# Patient Record
Sex: MALE | Race: WHITE | Employment: FULL TIME | URBAN - METROPOLITAN AREA
[De-identification: names, ages, dates, MRNs, and addresses within clinical notes are randomized per-mention and may not be internally consistent; named-entity substitution may affect disease eponyms.]

---

## 2022-11-27 ENCOUNTER — APPOINTMENT (OUTPATIENT)
Dept: CT IMAGING | Age: 32
DRG: 193 | End: 2022-11-27
Attending: EMERGENCY MEDICINE
Payer: MEDICAID

## 2022-11-27 ENCOUNTER — APPOINTMENT (OUTPATIENT)
Dept: GENERAL RADIOLOGY | Age: 32
DRG: 193 | End: 2022-11-27
Attending: EMERGENCY MEDICINE
Payer: MEDICAID

## 2022-11-27 ENCOUNTER — HOSPITAL ENCOUNTER (INPATIENT)
Age: 32
LOS: 2 days | Discharge: HOME | DRG: 193 | End: 2022-11-29
Attending: EMERGENCY MEDICINE | Admitting: HOSPITALIST
Payer: MEDICAID

## 2022-11-27 DIAGNOSIS — A41.9 SEPSIS, DUE TO UNSPECIFIED ORGANISM, UNSPECIFIED WHETHER ACUTE ORGAN DYSFUNCTION PRESENT (HCC): ICD-10-CM

## 2022-11-27 DIAGNOSIS — E13.10 DIABETIC KETOACIDOSIS WITHOUT COMA ASSOCIATED WITH OTHER SPECIFIED DIABETES MELLITUS (HCC): ICD-10-CM

## 2022-11-27 DIAGNOSIS — J18.9 COMMUNITY ACQUIRED PNEUMONIA OF RIGHT LUNG, UNSPECIFIED PART OF LUNG: Primary | ICD-10-CM

## 2022-11-27 DIAGNOSIS — E11.65 TYPE 2 DIABETES MELLITUS WITH HYPERGLYCEMIA, WITHOUT LONG-TERM CURRENT USE OF INSULIN (HCC): ICD-10-CM

## 2022-11-27 DIAGNOSIS — E87.1 HYPONATREMIA: ICD-10-CM

## 2022-11-27 DIAGNOSIS — E87.20 METABOLIC ACIDOSIS: ICD-10-CM

## 2022-11-27 DIAGNOSIS — R73.9 HYPERGLYCEMIA: ICD-10-CM

## 2022-11-27 LAB
ADMINISTERED INITIALS, ADMINIT: NORMAL
ALBUMIN SERPL-MCNC: 2.8 G/DL (ref 3.5–5)
ALBUMIN/GLOB SERPL: 0.5 {RATIO} (ref 1.1–2.2)
ALP SERPL-CCNC: 155 U/L (ref 45–117)
ALT SERPL-CCNC: 40 U/L (ref 12–78)
ANION GAP SERPL CALC-SCNC: 24 MMOL/L (ref 5–15)
AST SERPL-CCNC: 25 U/L (ref 15–37)
B-OH-BUTYR SERPL-SCNC: >4.42 MMOL/L
BASOPHILS # BLD: 0 K/UL (ref 0–0.1)
BASOPHILS NFR BLD: 0 % (ref 0–1)
BILIRUB SERPL-MCNC: 0.8 MG/DL (ref 0.2–1)
BUN SERPL-MCNC: 20 MG/DL (ref 6–20)
BUN/CREAT SERPL: 16 (ref 12–20)
CALCIUM SERPL-MCNC: 9.5 MG/DL (ref 8.5–10.1)
CHLORIDE SERPL-SCNC: 87 MMOL/L (ref 97–108)
CO2 SERPL-SCNC: 14 MMOL/L (ref 21–32)
COMMENT, HOLDF: NORMAL
COVID-19 RAPID TEST, COVR: NOT DETECTED
CREAT SERPL-MCNC: 1.26 MG/DL (ref 0.7–1.3)
D50 ADMINISTERED, D50ADM: 0 ML
D50 ORDER, D50ORD: 0 ML
DIFFERENTIAL METHOD BLD: ABNORMAL
EOSINOPHIL # BLD: 0 K/UL (ref 0–0.4)
EOSINOPHIL NFR BLD: 0 % (ref 0–7)
ERYTHROCYTE [DISTWIDTH] IN BLOOD BY AUTOMATED COUNT: 12.4 % (ref 11.5–14.5)
FLUAV AG NPH QL IA: NEGATIVE
FLUBV AG NOSE QL IA: NEGATIVE
GLOBULIN SER CALC-MCNC: 5.8 G/DL (ref 2–4)
GLUCOSE BLD STRIP.AUTO-MCNC: 233 MG/DL (ref 65–117)
GLUCOSE BLD STRIP.AUTO-MCNC: 259 MG/DL (ref 65–117)
GLUCOSE BLD STRIP.AUTO-MCNC: 267 MG/DL (ref 65–117)
GLUCOSE BLD STRIP.AUTO-MCNC: 326 MG/DL (ref 65–117)
GLUCOSE BLD STRIP.AUTO-MCNC: 339 MG/DL (ref 65–117)
GLUCOSE SERPL-MCNC: 416 MG/DL (ref 65–100)
GLUCOSE, GLC: 233 MG/DL
GLUCOSE, GLC: 259 MG/DL
GLUCOSE, GLC: 267 MG/DL
GLUCOSE, GLC: 326 MG/DL
GLUCOSE, GLC: 339 MG/DL
HCT VFR BLD AUTO: 48.6 % (ref 36.6–50.3)
HGB BLD-MCNC: 16.3 G/DL (ref 12.1–17)
HIGH TARGET, HITG: 200 MG/DL
IMM GRANULOCYTES # BLD AUTO: 0 K/UL
IMM GRANULOCYTES NFR BLD AUTO: 0 %
INSULIN ADMINSTERED, INSADM: 10 UNITS/HOUR
INSULIN ADMINSTERED, INSADM: 10.4 UNITS/HOUR
INSULIN ADMINSTERED, INSADM: 5.6 UNITS/HOUR
INSULIN ADMINSTERED, INSADM: 8 UNITS/HOUR
INSULIN ADMINSTERED, INSADM: 8.3 UNITS/HOUR
INSULIN ORDER, INSORD: 10 UNITS/HOUR
INSULIN ORDER, INSORD: 10.4 UNITS/HOUR
INSULIN ORDER, INSORD: 5.6 UNITS/HOUR
INSULIN ORDER, INSORD: 8 UNITS/HOUR
INSULIN ORDER, INSORD: 8.3 UNITS/HOUR
LACTATE SERPL-SCNC: 2.3 MMOL/L (ref 0.4–2)
LOW TARGET, LOT: 150 MG/DL
LYMPHOCYTES # BLD: 0.8 K/UL (ref 0.8–3.5)
LYMPHOCYTES NFR BLD: 3 % (ref 12–49)
MCH RBC QN AUTO: 28.6 PG (ref 26–34)
MCHC RBC AUTO-ENTMCNC: 33.5 G/DL (ref 30–36.5)
MCV RBC AUTO: 85.4 FL (ref 80–99)
METAMYELOCYTES NFR BLD MANUAL: 1 %
MINUTES UNTIL NEXT BG, NBG: 60 MIN
MONOCYTES # BLD: 2 K/UL (ref 0–1)
MONOCYTES NFR BLD: 8 % (ref 5–13)
MULTIPLIER, MUL: 0.02
MULTIPLIER, MUL: 0.03
MULTIPLIER, MUL: 0.04
MULTIPLIER, MUL: 0.05
MULTIPLIER, MUL: 0.06
NEUTS BAND NFR BLD MANUAL: 1 % (ref 0–6)
NEUTS SEG # BLD: 22.1 K/UL (ref 1.8–8)
NEUTS SEG NFR BLD: 87 % (ref 32–75)
NRBC # BLD: 0 K/UL (ref 0–0.01)
NRBC BLD-RTO: 0 PER 100 WBC
ORDER INITIALS, ORDINIT: NORMAL
PLATELET # BLD AUTO: 300 K/UL (ref 150–400)
PMV BLD AUTO: 10.9 FL (ref 8.9–12.9)
POTASSIUM SERPL-SCNC: 3.6 MMOL/L (ref 3.5–5.1)
PROT SERPL-MCNC: 8.6 G/DL (ref 6.4–8.2)
RBC # BLD AUTO: 5.69 M/UL (ref 4.1–5.7)
RBC MORPH BLD: ABNORMAL
SAMPLES BEING HELD,HOLD: NORMAL
SERVICE CMNT-IMP: ABNORMAL
SODIUM SERPL-SCNC: 125 MMOL/L (ref 136–145)
SOURCE, COVRS: NORMAL
TROPONIN-HIGH SENSITIVITY: 5 NG/L (ref 0–76)
WBC # BLD AUTO: 25.1 K/UL (ref 4.1–11.1)
WBC MORPH BLD: ABNORMAL

## 2022-11-27 PROCEDURE — 82962 GLUCOSE BLOOD TEST: CPT

## 2022-11-27 PROCEDURE — 93005 ELECTROCARDIOGRAM TRACING: CPT

## 2022-11-27 PROCEDURE — 74011000636 HC RX REV CODE- 636: Performed by: EMERGENCY MEDICINE

## 2022-11-27 PROCEDURE — 71260 CT THORAX DX C+: CPT

## 2022-11-27 PROCEDURE — 80053 COMPREHEN METABOLIC PANEL: CPT

## 2022-11-27 PROCEDURE — 83605 ASSAY OF LACTIC ACID: CPT

## 2022-11-27 PROCEDURE — 71045 X-RAY EXAM CHEST 1 VIEW: CPT

## 2022-11-27 PROCEDURE — 74011636637 HC RX REV CODE- 636/637: Performed by: EMERGENCY MEDICINE

## 2022-11-27 PROCEDURE — 85025 COMPLETE CBC W/AUTO DIFF WBC: CPT

## 2022-11-27 PROCEDURE — 96375 TX/PRO/DX INJ NEW DRUG ADDON: CPT

## 2022-11-27 PROCEDURE — 65660000001 HC RM ICU INTERMED STEPDOWN

## 2022-11-27 PROCEDURE — 74011000258 HC RX REV CODE- 258: Performed by: EMERGENCY MEDICINE

## 2022-11-27 PROCEDURE — 87635 SARS-COV-2 COVID-19 AMP PRB: CPT

## 2022-11-27 PROCEDURE — 96374 THER/PROPH/DIAG INJ IV PUSH: CPT

## 2022-11-27 PROCEDURE — 82010 KETONE BODYS QUAN: CPT

## 2022-11-27 PROCEDURE — 87804 INFLUENZA ASSAY W/OPTIC: CPT

## 2022-11-27 PROCEDURE — 87040 BLOOD CULTURE FOR BACTERIA: CPT

## 2022-11-27 PROCEDURE — 36415 COLL VENOUS BLD VENIPUNCTURE: CPT

## 2022-11-27 PROCEDURE — 74011250636 HC RX REV CODE- 250/636: Performed by: EMERGENCY MEDICINE

## 2022-11-27 PROCEDURE — 84484 ASSAY OF TROPONIN QUANT: CPT

## 2022-11-27 PROCEDURE — 99285 EMERGENCY DEPT VISIT HI MDM: CPT

## 2022-11-27 PROCEDURE — 74011000250 HC RX REV CODE- 250: Performed by: EMERGENCY MEDICINE

## 2022-11-27 PROCEDURE — 74011250637 HC RX REV CODE- 250/637: Performed by: EMERGENCY MEDICINE

## 2022-11-27 PROCEDURE — 96361 HYDRATE IV INFUSION ADD-ON: CPT

## 2022-11-27 RX ORDER — ACETAMINOPHEN 500 MG
1000 TABLET ORAL ONCE
Status: DISCONTINUED | OUTPATIENT
Start: 2022-11-27 | End: 2022-11-27 | Stop reason: SDUPTHER

## 2022-11-27 RX ORDER — KETOROLAC TROMETHAMINE 30 MG/ML
15 INJECTION, SOLUTION INTRAMUSCULAR; INTRAVENOUS
Status: COMPLETED | OUTPATIENT
Start: 2022-11-27 | End: 2022-11-27

## 2022-11-27 RX ORDER — ACETAMINOPHEN 500 MG
1000 TABLET ORAL
Status: COMPLETED | OUTPATIENT
Start: 2022-11-27 | End: 2022-11-27

## 2022-11-27 RX ORDER — ACETAMINOPHEN 500 MG
1000 TABLET ORAL ONCE
Status: COMPLETED | OUTPATIENT
Start: 2022-11-27 | End: 2022-11-27

## 2022-11-27 RX ORDER — ONDANSETRON 2 MG/ML
4 INJECTION INTRAMUSCULAR; INTRAVENOUS
Status: COMPLETED | OUTPATIENT
Start: 2022-11-27 | End: 2022-11-27

## 2022-11-27 RX ORDER — MAGNESIUM SULFATE 100 %
4 CRYSTALS MISCELLANEOUS AS NEEDED
Status: DISCONTINUED | OUTPATIENT
Start: 2022-11-27 | End: 2022-11-28 | Stop reason: SDUPTHER

## 2022-11-27 RX ORDER — IPRATROPIUM BROMIDE AND ALBUTEROL SULFATE 2.5; .5 MG/3ML; MG/3ML
9 SOLUTION RESPIRATORY (INHALATION)
Status: COMPLETED | OUTPATIENT
Start: 2022-11-27 | End: 2022-11-27

## 2022-11-27 RX ORDER — INSULIN LISPRO 100 [IU]/ML
INJECTION, SOLUTION INTRAVENOUS; SUBCUTANEOUS
Status: DISCONTINUED | OUTPATIENT
Start: 2022-11-28 | End: 2022-11-28

## 2022-11-27 RX ADMIN — KETOROLAC TROMETHAMINE 15 MG: 30 INJECTION, SOLUTION INTRAMUSCULAR; INTRAVENOUS at 15:19

## 2022-11-27 RX ADMIN — Medication 8 UNITS/HR: at 20:30

## 2022-11-27 RX ADMIN — IPRATROPIUM BROMIDE AND ALBUTEROL SULFATE 9 ML: .5; 3 SOLUTION RESPIRATORY (INHALATION) at 15:50

## 2022-11-27 RX ADMIN — SODIUM CHLORIDE 2 G: 9 INJECTION INTRAMUSCULAR; INTRAVENOUS; SUBCUTANEOUS at 15:20

## 2022-11-27 RX ADMIN — ACETAMINOPHEN 1000 MG: 500 TABLET ORAL at 20:01

## 2022-11-27 RX ADMIN — Medication 0.05 UNITS/KG/HR: at 18:51

## 2022-11-27 RX ADMIN — SODIUM CHLORIDE 1000 ML: 9 INJECTION, SOLUTION INTRAVENOUS at 15:16

## 2022-11-27 RX ADMIN — IOPAMIDOL 100 ML: 612 INJECTION, SOLUTION INTRAVENOUS at 15:37

## 2022-11-27 RX ADMIN — ONDANSETRON HYDROCHLORIDE 4 MG: 2 SOLUTION INTRAMUSCULAR; INTRAVENOUS at 15:18

## 2022-11-27 RX ADMIN — SODIUM CHLORIDE 1000 ML: 9 INJECTION, SOLUTION INTRAVENOUS at 14:49

## 2022-11-27 RX ADMIN — ACETAMINOPHEN 1000 MG: 500 TABLET ORAL at 15:49

## 2022-11-27 RX ADMIN — AZITHROMYCIN DIHYDRATE 500 MG: 500 INJECTION, POWDER, LYOPHILIZED, FOR SOLUTION INTRAVENOUS at 16:01

## 2022-11-27 NOTE — ED NOTES
Pt arrives ambulatory to ED with CC of Chest Pain and SOB accompanied with fever of 104 at home. Pt denies radiating pain. Pt notes sx persist for approx 4-5 days.

## 2022-11-28 LAB
ADMINISTERED INITIALS, ADMINIT: NORMAL
AMPHET UR QL SCN: NEGATIVE
ANION GAP SERPL CALC-SCNC: 11 MMOL/L (ref 5–15)
ATRIAL RATE: 106 BPM
ATRIAL RATE: 115 BPM
ATRIAL RATE: 139 BPM
ATRIAL RATE: 143 BPM
B PERT DNA SPEC QL NAA+PROBE: NOT DETECTED
BARBITURATES UR QL SCN: NEGATIVE
BENZODIAZ UR QL: NEGATIVE
BORDETELLA PARAPERTUSSIS PCR, BORPAR: NOT DETECTED
BUN SERPL-MCNC: 14 MG/DL (ref 6–20)
BUN/CREAT SERPL: 18 (ref 12–20)
C PNEUM DNA SPEC QL NAA+PROBE: NOT DETECTED
CALCIUM SERPL-MCNC: 9.1 MG/DL (ref 8.5–10.1)
CALCULATED P AXIS, ECG09: 42 DEGREES
CALCULATED P AXIS, ECG09: 45 DEGREES
CALCULATED P AXIS, ECG09: 70 DEGREES
CALCULATED P AXIS, ECG09: 83 DEGREES
CALCULATED R AXIS, ECG10: 119 DEGREES
CALCULATED R AXIS, ECG10: 128 DEGREES
CALCULATED R AXIS, ECG10: 85 DEGREES
CALCULATED R AXIS, ECG10: 94 DEGREES
CALCULATED T AXIS, ECG11: 26 DEGREES
CALCULATED T AXIS, ECG11: 41 DEGREES
CALCULATED T AXIS, ECG11: 46 DEGREES
CALCULATED T AXIS, ECG11: 58 DEGREES
CANNABINOIDS UR QL SCN: NEGATIVE
CHLORIDE SERPL-SCNC: 100 MMOL/L (ref 97–108)
CO2 SERPL-SCNC: 22 MMOL/L (ref 21–32)
COCAINE UR QL SCN: NEGATIVE
CREAT SERPL-MCNC: 0.8 MG/DL (ref 0.7–1.3)
D50 ADMINISTERED, D50ADM: 0 ML
D50 ORDER, D50ORD: 0 ML
DIAGNOSIS, 93000: NORMAL
DRUG SCRN COMMENT,DRGCM: ABNORMAL
EST. AVERAGE GLUCOSE BLD GHB EST-MCNC: 298 MG/DL
FLUAV SUBTYP SPEC NAA+PROBE: NOT DETECTED
FLUBV RNA SPEC QL NAA+PROBE: NOT DETECTED
GLUCOSE BLD STRIP.AUTO-MCNC: 131 MG/DL (ref 65–117)
GLUCOSE BLD STRIP.AUTO-MCNC: 144 MG/DL (ref 65–117)
GLUCOSE BLD STRIP.AUTO-MCNC: 148 MG/DL (ref 65–117)
GLUCOSE BLD STRIP.AUTO-MCNC: 151 MG/DL (ref 65–117)
GLUCOSE BLD STRIP.AUTO-MCNC: 153 MG/DL (ref 65–117)
GLUCOSE BLD STRIP.AUTO-MCNC: 158 MG/DL (ref 65–117)
GLUCOSE BLD STRIP.AUTO-MCNC: 173 MG/DL (ref 65–117)
GLUCOSE BLD STRIP.AUTO-MCNC: 182 MG/DL (ref 65–117)
GLUCOSE BLD STRIP.AUTO-MCNC: 182 MG/DL (ref 65–117)
GLUCOSE BLD STRIP.AUTO-MCNC: 195 MG/DL (ref 65–117)
GLUCOSE BLD STRIP.AUTO-MCNC: 200 MG/DL (ref 65–117)
GLUCOSE BLD STRIP.AUTO-MCNC: 204 MG/DL (ref 65–117)
GLUCOSE BLD STRIP.AUTO-MCNC: 222 MG/DL (ref 65–117)
GLUCOSE SERPL-MCNC: 154 MG/DL (ref 65–100)
GLUCOSE, GLC: 131 MG/DL
GLUCOSE, GLC: 144 MG/DL
GLUCOSE, GLC: 148 MG/DL
GLUCOSE, GLC: 151 MG/DL
GLUCOSE, GLC: 153 MG/DL
GLUCOSE, GLC: 158 MG/DL
GLUCOSE, GLC: 173 MG/DL
GLUCOSE, GLC: 182 MG/DL
GLUCOSE, GLC: 200 MG/DL
GLUCOSE, GLC: 204 MG/DL
GLUCOSE, GLC: 222 MG/DL
HADV DNA SPEC QL NAA+PROBE: NOT DETECTED
HBA1C MFR BLD: 12 % (ref 4–5.6)
HCOV 229E RNA SPEC QL NAA+PROBE: NOT DETECTED
HCOV HKU1 RNA SPEC QL NAA+PROBE: NOT DETECTED
HCOV NL63 RNA SPEC QL NAA+PROBE: NOT DETECTED
HCOV OC43 RNA SPEC QL NAA+PROBE: NOT DETECTED
HIGH TARGET, HITG: 200 MG/DL
HMPV RNA SPEC QL NAA+PROBE: NOT DETECTED
HPIV1 RNA SPEC QL NAA+PROBE: NOT DETECTED
HPIV2 RNA SPEC QL NAA+PROBE: NOT DETECTED
HPIV3 RNA SPEC QL NAA+PROBE: NOT DETECTED
HPIV4 RNA SPEC QL NAA+PROBE: NOT DETECTED
INSULIN ADMINSTERED, INSADM: 11.3 UNITS/HOUR
INSULIN ADMINSTERED, INSADM: 11.5 UNITS/HOUR
INSULIN ADMINSTERED, INSADM: 3.4 UNITS/HOUR
INSULIN ADMINSTERED, INSADM: 4.5 UNITS/HOUR
INSULIN ADMINSTERED, INSADM: 4.5 UNITS/HOUR
INSULIN ADMINSTERED, INSADM: 5 UNITS/HOUR
INSULIN ADMINSTERED, INSADM: 7.3 UNITS/HOUR
INSULIN ADMINSTERED, INSADM: 7.4 UNITS/HOUR
INSULIN ADMINSTERED, INSADM: 7.8 UNITS/HOUR
INSULIN ADMINSTERED, INSADM: 9 UNITS/HOUR
INSULIN ADMINSTERED, INSADM: 9.8 UNITS/HOUR
INSULIN ORDER, INSORD: 11.3 UNITS/HOUR
INSULIN ORDER, INSORD: 11.5 UNITS/HOUR
INSULIN ORDER, INSORD: 3.4 UNITS/HOUR
INSULIN ORDER, INSORD: 4.5 UNITS/HOUR
INSULIN ORDER, INSORD: 4.5 UNITS/HOUR
INSULIN ORDER, INSORD: 5 UNITS/HOUR
INSULIN ORDER, INSORD: 7.3 UNITS/HOUR
INSULIN ORDER, INSORD: 7.4 UNITS/HOUR
INSULIN ORDER, INSORD: 7.8 UNITS/HOUR
INSULIN ORDER, INSORD: 9 UNITS/HOUR
INSULIN ORDER, INSORD: 9.8 UNITS/HOUR
LACTATE SERPL-SCNC: 1.2 MMOL/L (ref 0.4–2)
LIPASE SERPL-CCNC: 64 U/L (ref 73–393)
LOW TARGET, LOT: 150 MG/DL
M PNEUMO DNA SPEC QL NAA+PROBE: NOT DETECTED
MAGNESIUM SERPL-MCNC: 1.9 MG/DL (ref 1.6–2.4)
MAGNESIUM SERPL-MCNC: 2.2 MG/DL (ref 1.6–2.4)
METHADONE UR QL: NEGATIVE
MINUTES UNTIL NEXT BG, NBG: 120 MIN
MINUTES UNTIL NEXT BG, NBG: 120 MIN
MINUTES UNTIL NEXT BG, NBG: 60 MIN
MULTIPLIER, MUL: 0.04
MULTIPLIER, MUL: 0.04
MULTIPLIER, MUL: 0.05
MULTIPLIER, MUL: 0.06
MULTIPLIER, MUL: 0.07
MULTIPLIER, MUL: 0.07
MULTIPLIER, MUL: 0.08
OPIATES UR QL: POSITIVE
ORDER INITIALS, ORDINIT: NORMAL
P-R INTERVAL, ECG05: 128 MS
P-R INTERVAL, ECG05: 138 MS
P-R INTERVAL, ECG05: 138 MS
P-R INTERVAL, ECG05: 144 MS
PCP UR QL: NEGATIVE
PHOSPHATE SERPL-MCNC: 1 MG/DL (ref 2.6–4.7)
POTASSIUM SERPL-SCNC: 3.1 MMOL/L (ref 3.5–5.1)
Q-T INTERVAL, ECG07: 284 MS
Q-T INTERVAL, ECG07: 288 MS
Q-T INTERVAL, ECG07: 340 MS
Q-T INTERVAL, ECG07: 378 MS
QRS DURATION, ECG06: 86 MS
QRS DURATION, ECG06: 90 MS
QRS DURATION, ECG06: 92 MS
QRS DURATION, ECG06: 92 MS
QTC CALCULATION (BEZET), ECG08: 438 MS
QTC CALCULATION (BEZET), ECG08: 438 MS
QTC CALCULATION (BEZET), ECG08: 470 MS
QTC CALCULATION (BEZET), ECG08: 502 MS
RSV RNA SPEC QL NAA+PROBE: NOT DETECTED
RV+EV RNA SPEC QL NAA+PROBE: DETECTED
SARS-COV-2 PCR, COVPCR: NOT DETECTED
SERVICE CMNT-IMP: ABNORMAL
SODIUM SERPL-SCNC: 133 MMOL/L (ref 136–145)
VENTRICULAR RATE, ECG03: 106 BPM
VENTRICULAR RATE, ECG03: 115 BPM
VENTRICULAR RATE, ECG03: 139 BPM
VENTRICULAR RATE, ECG03: 143 BPM

## 2022-11-28 PROCEDURE — 74011250637 HC RX REV CODE- 250/637: Performed by: EMERGENCY MEDICINE

## 2022-11-28 PROCEDURE — 80307 DRUG TEST PRSMV CHEM ANLYZR: CPT

## 2022-11-28 PROCEDURE — 74011000258 HC RX REV CODE- 258: Performed by: EMERGENCY MEDICINE

## 2022-11-28 PROCEDURE — 83735 ASSAY OF MAGNESIUM: CPT

## 2022-11-28 PROCEDURE — 36415 COLL VENOUS BLD VENIPUNCTURE: CPT

## 2022-11-28 PROCEDURE — 74011636637 HC RX REV CODE- 636/637: Performed by: HOSPITALIST

## 2022-11-28 PROCEDURE — 93005 ELECTROCARDIOGRAM TRACING: CPT

## 2022-11-28 PROCEDURE — 74011250636 HC RX REV CODE- 250/636: Performed by: HOSPITALIST

## 2022-11-28 PROCEDURE — 74011250637 HC RX REV CODE- 250/637: Performed by: HOSPITALIST

## 2022-11-28 PROCEDURE — 82962 GLUCOSE BLOOD TEST: CPT

## 2022-11-28 PROCEDURE — 74011000250 HC RX REV CODE- 250: Performed by: HOSPITALIST

## 2022-11-28 PROCEDURE — 74011000250 HC RX REV CODE- 250: Performed by: EMERGENCY MEDICINE

## 2022-11-28 PROCEDURE — 83690 ASSAY OF LIPASE: CPT

## 2022-11-28 PROCEDURE — 74011636637 HC RX REV CODE- 636/637: Performed by: NURSE PRACTITIONER

## 2022-11-28 PROCEDURE — 87449 NOS EACH ORGANISM AG IA: CPT

## 2022-11-28 PROCEDURE — 84100 ASSAY OF PHOSPHORUS: CPT

## 2022-11-28 PROCEDURE — 74011250637 HC RX REV CODE- 250/637: Performed by: NURSE PRACTITIONER

## 2022-11-28 PROCEDURE — 80048 BASIC METABOLIC PNL TOTAL CA: CPT

## 2022-11-28 PROCEDURE — 74011636637 HC RX REV CODE- 636/637: Performed by: EMERGENCY MEDICINE

## 2022-11-28 PROCEDURE — 83605 ASSAY OF LACTIC ACID: CPT

## 2022-11-28 PROCEDURE — 0202U NFCT DS 22 TRGT SARS-COV-2: CPT

## 2022-11-28 PROCEDURE — 65660000001 HC RM ICU INTERMED STEPDOWN

## 2022-11-28 PROCEDURE — 83036 HEMOGLOBIN GLYCOSYLATED A1C: CPT

## 2022-11-28 RX ORDER — SODIUM CHLORIDE 0.9 % (FLUSH) 0.9 %
5-40 SYRINGE (ML) INJECTION EVERY 8 HOURS
Status: DISCONTINUED | OUTPATIENT
Start: 2022-11-28 | End: 2022-11-29 | Stop reason: HOSPADM

## 2022-11-28 RX ORDER — BUPROPION HYDROCHLORIDE 75 MG/1
75 TABLET ORAL 2 TIMES DAILY
Status: DISCONTINUED | OUTPATIENT
Start: 2022-11-28 | End: 2022-11-29 | Stop reason: HOSPADM

## 2022-11-28 RX ORDER — INSULIN GLARGINE 100 [IU]/ML
30 INJECTION, SOLUTION SUBCUTANEOUS DAILY
Status: DISCONTINUED | OUTPATIENT
Start: 2022-11-28 | End: 2022-11-29

## 2022-11-28 RX ORDER — ONDANSETRON 2 MG/ML
4 INJECTION INTRAMUSCULAR; INTRAVENOUS
Status: DISCONTINUED | OUTPATIENT
Start: 2022-11-28 | End: 2022-11-29 | Stop reason: HOSPADM

## 2022-11-28 RX ORDER — SODIUM CHLORIDE 450 MG/100ML
100 INJECTION, SOLUTION INTRAVENOUS CONTINUOUS
Status: DISCONTINUED | OUTPATIENT
Start: 2022-11-28 | End: 2022-11-29 | Stop reason: HOSPADM

## 2022-11-28 RX ORDER — SODIUM CHLORIDE 0.9 % (FLUSH) 0.9 %
5-40 SYRINGE (ML) INJECTION AS NEEDED
Status: DISCONTINUED | OUTPATIENT
Start: 2022-11-28 | End: 2022-11-29 | Stop reason: HOSPADM

## 2022-11-28 RX ORDER — BUPROPION HYDROCHLORIDE 75 MG/1
75 TABLET ORAL 2 TIMES DAILY
COMMUNITY

## 2022-11-28 RX ORDER — ENOXAPARIN SODIUM 100 MG/ML
40 INJECTION SUBCUTANEOUS EVERY 12 HOURS
Status: DISCONTINUED | OUTPATIENT
Start: 2022-11-28 | End: 2022-11-29

## 2022-11-28 RX ORDER — HYDROCODONE BITARTRATE AND ACETAMINOPHEN 5; 325 MG/1; MG/1
1 TABLET ORAL
Status: DISCONTINUED | OUTPATIENT
Start: 2022-11-28 | End: 2022-11-29 | Stop reason: HOSPADM

## 2022-11-28 RX ORDER — HYDROXYZINE 50 MG/1
25 TABLET, FILM COATED ORAL 2 TIMES DAILY
COMMUNITY

## 2022-11-28 RX ORDER — POTASSIUM CHLORIDE 7.45 MG/ML
10 INJECTION INTRAVENOUS
Status: COMPLETED | OUTPATIENT
Start: 2022-11-28 | End: 2022-11-28

## 2022-11-28 RX ORDER — INSULIN GLARGINE 100 [IU]/ML
60 INJECTION, SOLUTION SUBCUTANEOUS DAILY
Status: DISCONTINUED | OUTPATIENT
Start: 2022-11-28 | End: 2022-11-28

## 2022-11-28 RX ORDER — INSULIN LISPRO 100 [IU]/ML
INJECTION, SOLUTION INTRAVENOUS; SUBCUTANEOUS
Status: DISCONTINUED | OUTPATIENT
Start: 2022-11-28 | End: 2022-11-29 | Stop reason: HOSPADM

## 2022-11-28 RX ORDER — POLYETHYLENE GLYCOL 3350 17 G/17G
17 POWDER, FOR SOLUTION ORAL DAILY PRN
Status: DISCONTINUED | OUTPATIENT
Start: 2022-11-28 | End: 2022-11-29 | Stop reason: HOSPADM

## 2022-11-28 RX ORDER — PROMETHAZINE HYDROCHLORIDE 25 MG/1
12.5 TABLET ORAL
Status: DISCONTINUED | OUTPATIENT
Start: 2022-11-28 | End: 2022-11-29 | Stop reason: HOSPADM

## 2022-11-28 RX ORDER — HYDROXYZINE 25 MG/1
25 TABLET, FILM COATED ORAL 2 TIMES DAILY
Status: DISCONTINUED | OUTPATIENT
Start: 2022-11-28 | End: 2022-11-29 | Stop reason: HOSPADM

## 2022-11-28 RX ORDER — MAGNESIUM SULFATE 100 %
4 CRYSTALS MISCELLANEOUS AS NEEDED
Status: DISCONTINUED | OUTPATIENT
Start: 2022-11-28 | End: 2022-11-29 | Stop reason: HOSPADM

## 2022-11-28 RX ORDER — ACETAMINOPHEN 500 MG
1000 TABLET ORAL ONCE
Status: COMPLETED | OUTPATIENT
Start: 2022-11-28 | End: 2022-11-28

## 2022-11-28 RX ORDER — ACETAMINOPHEN 650 MG/1
650 SUPPOSITORY RECTAL
Status: DISCONTINUED | OUTPATIENT
Start: 2022-11-28 | End: 2022-11-29 | Stop reason: HOSPADM

## 2022-11-28 RX ORDER — ACETAMINOPHEN 325 MG/1
650 TABLET ORAL
Status: DISCONTINUED | OUTPATIENT
Start: 2022-11-28 | End: 2022-11-29 | Stop reason: HOSPADM

## 2022-11-28 RX ADMIN — POTASSIUM CHLORIDE 10 MEQ: 7.46 INJECTION, SOLUTION INTRAVENOUS at 16:33

## 2022-11-28 RX ADMIN — SODIUM CHLORIDE, PRESERVATIVE FREE 5 ML: 5 INJECTION INTRAVENOUS at 14:51

## 2022-11-28 RX ADMIN — SODIUM CHLORIDE 100 ML/HR: 4.5 INJECTION, SOLUTION INTRAVENOUS at 22:20

## 2022-11-28 RX ADMIN — POTASSIUM CHLORIDE 10 MEQ: 7.46 INJECTION, SOLUTION INTRAVENOUS at 11:19

## 2022-11-28 RX ADMIN — ENOXAPARIN SODIUM 40 MG: 100 INJECTION SUBCUTANEOUS at 22:16

## 2022-11-28 RX ADMIN — DEXTROSE MONOHYDRATE 250 ML: 100 INJECTION, SOLUTION INTRAVENOUS at 03:28

## 2022-11-28 RX ADMIN — Medication 9.8 UNITS/HR: at 01:13

## 2022-11-28 RX ADMIN — Medication 2 UNITS: at 17:44

## 2022-11-28 RX ADMIN — ENOXAPARIN SODIUM 40 MG: 100 INJECTION SUBCUTANEOUS at 11:18

## 2022-11-28 RX ADMIN — POTASSIUM CHLORIDE 10 MEQ: 7.46 INJECTION, SOLUTION INTRAVENOUS at 14:07

## 2022-11-28 RX ADMIN — POTASSIUM CHLORIDE 10 MEQ: 7.46 INJECTION, SOLUTION INTRAVENOUS at 14:59

## 2022-11-28 RX ADMIN — SODIUM CHLORIDE 100 ML/HR: 4.5 INJECTION, SOLUTION INTRAVENOUS at 11:19

## 2022-11-28 RX ADMIN — CEFTRIAXONE SODIUM 1 G: 1 INJECTION, POWDER, FOR SOLUTION INTRAMUSCULAR; INTRAVENOUS at 14:47

## 2022-11-28 RX ADMIN — HYDROXYZINE HYDROCHLORIDE 25 MG: 25 TABLET, FILM COATED ORAL at 17:44

## 2022-11-28 RX ADMIN — ACETAMINOPHEN 650 MG: 325 TABLET ORAL at 23:45

## 2022-11-28 RX ADMIN — HYDROCODONE BITARTRATE AND ACETAMINOPHEN 1 TABLET: 5; 325 TABLET ORAL at 17:44

## 2022-11-28 RX ADMIN — ACETAMINOPHEN 650 MG: 325 TABLET ORAL at 16:41

## 2022-11-28 RX ADMIN — INSULIN GLARGINE 30 UNITS: 100 INJECTION, SOLUTION SUBCUTANEOUS at 14:46

## 2022-11-28 RX ADMIN — AZITHROMYCIN DIHYDRATE 500 MG: 500 INJECTION, POWDER, LYOPHILIZED, FOR SOLUTION INTRAVENOUS at 15:04

## 2022-11-28 RX ADMIN — Medication 7.3 UNITS/HR: at 06:39

## 2022-11-28 RX ADMIN — ACETAMINOPHEN 1000 MG: 500 TABLET ORAL at 06:41

## 2022-11-28 RX ADMIN — BUPROPION HYDROCHLORIDE 75 MG: 75 TABLET, FILM COATED ORAL at 17:44

## 2022-11-28 RX ADMIN — SODIUM CHLORIDE, PRESERVATIVE FREE 10 ML: 5 INJECTION INTRAVENOUS at 22:23

## 2022-11-28 RX ADMIN — HYDROCODONE BITARTRATE AND ACETAMINOPHEN 1 TABLET: 5; 325 TABLET ORAL at 11:15

## 2022-11-28 NOTE — ED NOTES
Hospitalist called back and discussed plan of care with this RN. Orders have been submitted and discussed.

## 2022-11-28 NOTE — PROGRESS NOTES
1230: TRANSFER - IN REPORT:    Verbal report received from Adolfo Gamez RN (name) on iNka Neumann  being received from [facility] (unit) for routine progression of care      Report consisted of patients Situation, Background, Assessment and   Recommendations(SBAR). Information from the following report(s) SBAR, Kardex, MAR, and Cardiac Rhythm ST  was reviewed with the receiving nurse. Opportunity for questions and clarification was provided. Assessment completed upon patients arrival to unit and care assumed. 1319: PT arrived to unit, connected to monitor and VS obtained. Upon arrival rhythm change noted, EKG obtained and indicated NSR with occasional PVCs, ALESSANDRO Lechuga notified. ALESSANDRO Lechuga at bedside. 1400: Pt stated he needs to speak with a  tomorrow related to insurance issues, ALESSANDRO Lechuga notified. 1930: Bedside and Verbal shift change report given to Jaron Sandy RN (oncoming nurse) by Marisol Pineda RN (offgoing nurse). Report included the following information SBAR, Kardex, MAR, and Cardiac Rhythm NSR with occasional PACs/PVCs . Care Plan:   Problem: Diabetes Self-Management  Goal: *Disease process and treatment process  Description: Define diabetes and identify own type of diabetes; list 3 options for treating diabetes. Outcome: Progressing Towards Goal  Goal: *Incorporating nutritional management into lifestyle  Description: Describe effect of type, amount and timing of food on blood glucose; list 3 methods for planning meals. Outcome: Progressing Towards Goal       Problem: Patient Education: Go to Patient Education Activity  Goal: Patient/Family Education  Outcome: Progressing Towards Goal       Problem: Falls - Risk of  Goal: *Absence of Falls  Description: Document Jair Gaming Fall Risk and appropriate interventions in the flowsheet.   Outcome: Progressing Towards Goal  Note: Fall Risk Interventions:     Medication Interventions: Teach patient to arise slowly       Problem: Patient Education: Go to Patient Education Activity  Goal: Patient/Family Education  Outcome: Progressing Towards Goal

## 2022-11-28 NOTE — ED NOTES
TRANSFER - OUT REPORT:    Verbal report given to GERMÁN Hutson(name) on Radha Yip  being transferred to [facility](unit) for routine progression of care       Report consisted of patients Situation, Background, Assessment and   Recommendations(SBAR). Information from the following report(s) SBAR, ED Summary, MAR, and Recent Results was reviewed with the receiving nurse. Lines:   Peripheral IV 11/27/22 Right Antecubital (Active)       Peripheral IV 11/28/22 Left;Posterior Hand (Active)        Opportunity for questions and clarification was provided.       Patient transported with:   Monitor And IV Medications

## 2022-11-28 NOTE — ED NOTES
Verbal shift change report given to Landen Miner RN (oncoming nurse) by Buddy Marin RN (offgoing nurse). Report included the following information SBAR, ED Summary, MAR, Recent Results, and Cardiac Rhythm Sinus Tach .

## 2022-11-28 NOTE — ED NOTES
Hospitalist aware Lantus is not available at [address] [facility]. Verified IV Compatibility with Pharmacy.

## 2022-11-28 NOTE — ED NOTES
[facility] is on scene. Verbal Report given to   [facility]. [facility] is in possession of EMTALA, ED Summary, ED Facesheet. Patient is resting comfortably in stretcher.

## 2022-11-29 VITALS
RESPIRATION RATE: 18 BRPM | BODY MASS INDEX: 36.45 KG/M2 | HEIGHT: 78 IN | SYSTOLIC BLOOD PRESSURE: 143 MMHG | DIASTOLIC BLOOD PRESSURE: 91 MMHG | HEART RATE: 101 BPM | TEMPERATURE: 98.4 F | WEIGHT: 315 LBS | OXYGEN SATURATION: 99 %

## 2022-11-29 LAB
ANION GAP SERPL CALC-SCNC: 10 MMOL/L (ref 5–15)
ANION GAP SERPL CALC-SCNC: 13 MMOL/L (ref 5–15)
BASOPHILS # BLD: 0 K/UL (ref 0–0.1)
BASOPHILS NFR BLD: 0 % (ref 0–1)
BUN SERPL-MCNC: 14 MG/DL (ref 6–20)
BUN SERPL-MCNC: 15 MG/DL (ref 6–20)
BUN/CREAT SERPL: 16 (ref 12–20)
BUN/CREAT SERPL: 21 (ref 12–20)
CALCIUM SERPL-MCNC: 8.5 MG/DL (ref 8.5–10.1)
CALCIUM SERPL-MCNC: 8.6 MG/DL (ref 8.5–10.1)
CHLORIDE SERPL-SCNC: 100 MMOL/L (ref 97–108)
CHLORIDE SERPL-SCNC: 101 MMOL/L (ref 97–108)
CO2 SERPL-SCNC: 23 MMOL/L (ref 21–32)
CO2 SERPL-SCNC: 23 MMOL/L (ref 21–32)
CREAT SERPL-MCNC: 0.73 MG/DL (ref 0.7–1.3)
CREAT SERPL-MCNC: 0.89 MG/DL (ref 0.7–1.3)
DIFFERENTIAL METHOD BLD: ABNORMAL
EOSINOPHIL # BLD: 0.2 K/UL (ref 0–0.4)
EOSINOPHIL NFR BLD: 1 % (ref 0–7)
ERYTHROCYTE [DISTWIDTH] IN BLOOD BY AUTOMATED COUNT: 12.3 % (ref 11.5–14.5)
GLUCOSE BLD STRIP.AUTO-MCNC: 188 MG/DL (ref 65–117)
GLUCOSE BLD STRIP.AUTO-MCNC: 231 MG/DL (ref 65–117)
GLUCOSE BLD STRIP.AUTO-MCNC: 275 MG/DL (ref 65–117)
GLUCOSE SERPL-MCNC: 170 MG/DL (ref 65–100)
GLUCOSE SERPL-MCNC: 243 MG/DL (ref 65–100)
HCT VFR BLD AUTO: 41.3 % (ref 36.6–50.3)
HGB BLD-MCNC: 14.4 G/DL (ref 12.1–17)
IMM GRANULOCYTES # BLD AUTO: 0 K/UL
IMM GRANULOCYTES NFR BLD AUTO: 0 %
LYMPHOCYTES # BLD: 3.3 K/UL (ref 0.8–3.5)
LYMPHOCYTES NFR BLD: 16 % (ref 12–49)
MAGNESIUM SERPL-MCNC: 2 MG/DL (ref 1.6–2.4)
MCH RBC QN AUTO: 29.1 PG (ref 26–34)
MCHC RBC AUTO-ENTMCNC: 34.9 G/DL (ref 30–36.5)
MCV RBC AUTO: 83.6 FL (ref 80–99)
MONOCYTES # BLD: 2.1 K/UL (ref 0–1)
MONOCYTES NFR BLD: 10 % (ref 5–13)
NEUTS SEG # BLD: 14.9 K/UL (ref 1.8–8)
NEUTS SEG NFR BLD: 73 % (ref 32–75)
NRBC # BLD: 0 K/UL (ref 0–0.01)
NRBC BLD-RTO: 0 PER 100 WBC
PHOSPHATE SERPL-MCNC: 1.6 MG/DL (ref 2.6–4.7)
PLATELET # BLD AUTO: 266 K/UL (ref 150–400)
PMV BLD AUTO: 10.7 FL (ref 8.9–12.9)
POTASSIUM SERPL-SCNC: 2.7 MMOL/L (ref 3.5–5.1)
POTASSIUM SERPL-SCNC: 3.2 MMOL/L (ref 3.5–5.1)
RBC # BLD AUTO: 4.94 M/UL (ref 4.1–5.7)
RBC MORPH BLD: ABNORMAL
SERVICE CMNT-IMP: ABNORMAL
SODIUM SERPL-SCNC: 134 MMOL/L (ref 136–145)
SODIUM SERPL-SCNC: 136 MMOL/L (ref 136–145)
WBC # BLD AUTO: 20.5 K/UL (ref 4.1–11.1)

## 2022-11-29 PROCEDURE — 74011250637 HC RX REV CODE- 250/637: Performed by: NURSE PRACTITIONER

## 2022-11-29 PROCEDURE — 74011636637 HC RX REV CODE- 636/637: Performed by: NURSE PRACTITIONER

## 2022-11-29 PROCEDURE — 85025 COMPLETE CBC W/AUTO DIFF WBC: CPT

## 2022-11-29 PROCEDURE — 74011000250 HC RX REV CODE- 250: Performed by: HOSPITALIST

## 2022-11-29 PROCEDURE — 84100 ASSAY OF PHOSPHORUS: CPT

## 2022-11-29 PROCEDURE — 82962 GLUCOSE BLOOD TEST: CPT

## 2022-11-29 PROCEDURE — 83735 ASSAY OF MAGNESIUM: CPT

## 2022-11-29 PROCEDURE — 74011250637 HC RX REV CODE- 250/637: Performed by: HOSPITALIST

## 2022-11-29 PROCEDURE — 80048 BASIC METABOLIC PNL TOTAL CA: CPT

## 2022-11-29 PROCEDURE — 36415 COLL VENOUS BLD VENIPUNCTURE: CPT

## 2022-11-29 PROCEDURE — 74011636637 HC RX REV CODE- 636/637: Performed by: HOSPITALIST

## 2022-11-29 PROCEDURE — 74011250636 HC RX REV CODE- 250/636: Performed by: HOSPITALIST

## 2022-11-29 RX ORDER — INSULIN GLARGINE 100 [IU]/ML
10 INJECTION, SOLUTION SUBCUTANEOUS ONCE
Status: COMPLETED | OUTPATIENT
Start: 2022-11-29 | End: 2022-11-29

## 2022-11-29 RX ORDER — AZITHROMYCIN 250 MG/1
TABLET, FILM COATED ORAL
Qty: 6 TABLET | Refills: 0 | Status: SHIPPED | OUTPATIENT
Start: 2022-11-29 | End: 2022-12-04

## 2022-11-29 RX ORDER — METFORMIN HYDROCHLORIDE 500 MG/1
500 TABLET ORAL 2 TIMES DAILY WITH MEALS
Qty: 60 TABLET | Refills: 2 | Status: SHIPPED | OUTPATIENT
Start: 2022-11-29

## 2022-11-29 RX ORDER — CEFUROXIME AXETIL 500 MG/1
500 TABLET ORAL 2 TIMES DAILY
Qty: 10 TABLET | Refills: 0 | Status: SHIPPED | OUTPATIENT
Start: 2022-11-29

## 2022-11-29 RX ORDER — INSULIN PUMP SYRINGE, 3 ML
EACH MISCELLANEOUS
Qty: 1 KIT | Refills: 0 | Status: SHIPPED | OUTPATIENT
Start: 2022-11-29

## 2022-11-29 RX ORDER — ENOXAPARIN SODIUM 100 MG/ML
30 INJECTION SUBCUTANEOUS EVERY 12 HOURS
Status: DISCONTINUED | OUTPATIENT
Start: 2022-11-29 | End: 2022-11-29 | Stop reason: HOSPADM

## 2022-11-29 RX ORDER — INSULIN GLARGINE 100 [IU]/ML
40 INJECTION, SOLUTION SUBCUTANEOUS DAILY
Status: DISCONTINUED | OUTPATIENT
Start: 2022-11-30 | End: 2022-11-29 | Stop reason: HOSPADM

## 2022-11-29 RX ORDER — INSULIN GLARGINE 100 [IU]/ML
45 INJECTION, SOLUTION SUBCUTANEOUS
Qty: 1 ML | Refills: 2 | Status: SHIPPED | OUTPATIENT
Start: 2022-11-29 | End: 2022-11-29

## 2022-11-29 RX ORDER — INSULIN LISPRO 100 [IU]/ML
10 INJECTION, SOLUTION INTRAVENOUS; SUBCUTANEOUS
Status: DISCONTINUED | OUTPATIENT
Start: 2022-11-29 | End: 2022-11-29 | Stop reason: HOSPADM

## 2022-11-29 RX ADMIN — INSULIN GLARGINE 30 UNITS: 100 INJECTION, SOLUTION SUBCUTANEOUS at 08:55

## 2022-11-29 RX ADMIN — Medication 10 UNITS: at 11:00

## 2022-11-29 RX ADMIN — Medication 10 UNITS: at 12:50

## 2022-11-29 RX ADMIN — SODIUM CHLORIDE, PRESERVATIVE FREE 10 ML: 5 INJECTION INTRAVENOUS at 07:12

## 2022-11-29 RX ADMIN — CEFTRIAXONE SODIUM 1 G: 1 INJECTION, POWDER, FOR SOLUTION INTRAMUSCULAR; INTRAVENOUS at 15:13

## 2022-11-29 RX ADMIN — BUPROPION HYDROCHLORIDE 75 MG: 75 TABLET, FILM COATED ORAL at 08:55

## 2022-11-29 RX ADMIN — HYDROXYZINE HYDROCHLORIDE 25 MG: 25 TABLET, FILM COATED ORAL at 08:55

## 2022-11-29 RX ADMIN — SODIUM CHLORIDE 100 ML/HR: 4.5 INJECTION, SOLUTION INTRAVENOUS at 09:28

## 2022-11-29 RX ADMIN — HYDROCODONE BITARTRATE AND ACETAMINOPHEN 1 TABLET: 5; 325 TABLET ORAL at 10:38

## 2022-11-29 RX ADMIN — Medication 3 UNITS: at 07:06

## 2022-11-29 RX ADMIN — Medication 5 UNITS: at 12:50

## 2022-11-29 RX ADMIN — SODIUM CHLORIDE, PRESERVATIVE FREE 10 ML: 5 INJECTION INTRAVENOUS at 15:13

## 2022-11-29 RX ADMIN — ENOXAPARIN SODIUM 40 MG: 100 INJECTION SUBCUTANEOUS at 08:55

## 2022-11-29 NOTE — DIABETES MGMT
[address] NURSE SPECIALIST CONSULT     Initial Presentation   Naseem Alvarado is a 29 y.o. male who presented to the ED 11/27/22 with a 4-5 day c/o chest pain, SOB and fever of 104. LAB: , Co2 14, AG 24, , , Lac 2.3, A1C 12%, WBC 25.1, B-hydroxybutyrate > 4.42. positive opiates. Rhinovirus/enterovirus positive. Flu/RSV/COVID -,    CXR: Dense airspace disease right upper lobe  CT:  Right upper lobe airspace disease. No suspicious mass/nodule. Calcified granulomas. HX: Polysubstance Abuse, Anxiety, Depression, Type 2 Diabetes     INITIAL DX:   CAP (community acquired pneumonia) [J18.9]   DKA  Current Treatment     TX: Blood cultures, IV Antibiotics, IVF, Insulin gtt    Consulted by  Desmond Mckeon NP  for advanced diabetes nursing assessment and care for:   [x] Inpatient management strategy    Hospital Course   Clinical progress has been uncomplicated. 11/27: Admission. Insulin gtt. 11/28: DKA resolved. Transitioned off insulin gtt to SQ insulin  Diabetes History   Diagnosed with Type 2 Diabetes 6 years ago. Was started on metformin and insulin therapy (doses and insulin type unknown) when in [place]. Took medications for about 6 wks. Was frustrated with elevated BG values and stopped taking medicine. Did not like his provider and stopped going for follow-up care. Diabetes-related Medical History  Acute complications  DKA  Neurological complications  Peripheral neuropathy  Other associated conditions     Depression; Substance Abuse    Diabetes Medication History  Key Antihyperglycemic Medications       Patient is on no antihyperglycemic meds.              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  Monitoring pattern   Doesn't have a glucometer  Taking medications pattern  Stopped taking medications about 5 years ago  Social determinants of health impacting diabetes self-management practices   Struggling with anxiety and/or depression and Concerned that you need to know more about how to stay healthy with diabetes  Overall evaluation:    [x] Not achieving A1c target with drug therapy & self-care practices    Current Vape use  Previous cocaine use- last use 6 mos ago  Was homeless in [place], now back in [address] [facility] living with mom  Legally  and has a 11year old daughter living with him  Works full time at a Car wash in [address] was seeing a doctor who was telling me what to do. It didn't matter how hard I was working, I didn't like being told what to do and being lectured about all the wrong things I was doing.      Objective   Physical exam  General Obese white male in no acute distress/ill-appearing. Conversant and cooperative  Neuro  Alert, oriented   Vital Signs Visit Vitals  /84   Pulse 91   Temp 98.4 °F (36.9 °C)   Resp 18   Ht 6' 6\" (1.981 m)   Wt 150.3 kg (331 lb 5.6 oz)   SpO2 99%   BMI 38.29 kg/m²     Skin  Warm and dry. No acanthosis noted along neckline. Heart   Regular rate and rhythm.  No murmurs, rubs or gallops  Lungs  Clear to auscultation without rales or rhonchi  Extremities No foot wounds        Laboratory  Recent Labs     11/29/22  0319 11/28/22  1433 11/28/22  0906 11/27/22  1410   * 154* 170* 416*   AGAP 10 11 13 24*   WBC 20.5*  --   --  25.1*   CREA 0.73 0.80 0.89 1.26   AST  --   --   --  25   ALT  --   --   --  40       Factors impacting BG management  Factor Dose Comments   Nutrition:  Standard meals     60 grams/meal      Infection CAP  IV antibiotics       Blood glucose pattern      Significant diabetes-related events over the past 24-72 hours  A1C 12%  Fasting B  Pre-prandial: 195 at bed  Basal: Lantus 30 units SQ  Bolus:  None  Correction: 5 units in the last 24h  1/2 NS IVF    Assessment and Plan   Nursing Diagnosis Risk for unstable blood glucose pattern   Nursing Intervention Domain 5250 Decision-making Support   Nursing Interventions Examined current inpatient diabetes/blood glucose control   Explored factors facilitating and impeding inpatient management  Explored corrective strategies with patient and responsible inpatient provider   Informed patient of rational for insulin strategy while hospitalized     Nursing Diagnosis 87975 Ineffective Health Management   Nursing Intervention Domain 5250 Decision-makingSupport   Nursing Interventions Identified diabetes self-management practices impeding diabetes control  Discussed diabetes survival skills related to  Reviewed Type 2 Diabetes basics including basic patho, normal and abnormal labs and goals (BG and A1C)  Healthy Plate eating plan; given handouts  Role of physical activity in improving insulin sensitivity and action. Reviewed Insulin demo pen. Procedure for blood glucose monitoring. Medications plan at discharge     Evaluation   Gera Bee is a [address]19 [address] old gentleman, with Type 2 Diabetes off antihyperglycemic therapy, who was admitted with DKA s/t CAP with rhinovirus/enterovirus positive. A1C on admission was significantly elevated at 12% and initial labs included , Co2 14, AG 24, , , Lac 2.3, WBC 25.1, B-hydroxybutyrate > 4.42. positive opiates. He was fluid resuscitated, started on an insulin gtt and IV antibiotics. DKA resolved and he transitioned to low dose Lantus-30 units and correctional insulin. His fasting BG was elevated at 231 and will need a higher basal dose. He would also benefit from bolus humalog as eating well. Please continue to modify insulin therapy to target an inpatient BG goal of 140-180mg/dl.   He will need continued inpatient and outpatient support for diabetes care. Recommendations   POC glucose ACHS    Consistent carbohydrate diet (60 grams CHO/meal)    3. Adjust the subcutaneous Insulin Order set (2585)  Insulin Dosing Specific recommendation   Basal                                      (Based on weight, BMI & GFR) Please give additional 15 units   Lantus x1 now then increase   dose to :  [x] 0.3 units/kg/D: 45 units Lantus once daily starting tomorrow      Nutritional                                      (Based on CHO/dextrose load) [x] Moderate sensitivity: Start 10 units Humalog/meal (low dose 0.05 units/kg/day)    Corrective                                       (Useful in adjusting insulin dosing) [x] Normal sensitivity ACHS        Please encourage patient to participate in diabetes self care while in the hospital including allowing patient to use lancet for blood glucose monitoring and self-administer insulin injections. Discharge Recommendations   Will need a FUV with PCP within 1-2 weeks after hospital discharge for ongoing diabetes management     Outpatient order for \"diabetes education\" placed. This will trigger a referral for the Program for Diabetes [facility] which includes outpatient diabetes self management training with a certified diabetes educator. Lantus PEN: 40 units SQ once daily    Humalog PEN: 10 units SQ with the largest meal of the day    Metformin XR: 500mg BID    Prescription for glucometer kit (Meter, Lancets (100), Strips (100)). Patient to obtain a blood glucose reading four times daily. First thing in the morning prior to eating and drinking anything then before lunch, dinner and bedtime. Create a log and present to PCP for interpretation.       Billing Code(s)   [x] 62306/48154    Before making these care recommendations, I personally reviewed the hospitalization record, including notes, laboratory & diagnostic data and current medications, and examined the patient at the bedside (circumstances permitting) before making care recommendations. More than fifty (50) percent of the time was spent in patient counseling and/or care coordination.   Total minutes: [de-identified]      SONDRA Liao BC-ADM  Board Certified Advanced Diabetes Manager  Clinical Nurse Specialist  [facility]  Access via [address] [place] [address]

## 2022-11-29 NOTE — DISCHARGE INSTRUCTIONS
Discharge Instructions       PATIENT ID: Nika Neumann  MRN: [id number]   YOB: 1990    DATE OF ADMISSION: [unfilled]    DATE OF DISCHARGE: 11/29/2022    PRIMARY CARE PROVIDER: @PCP@     ATTENDING PHYSICIAN: [unfilled]  DISCHARGING PROVIDER: Lisbet Valenzuela MD    To contact this individual call [phone] and ask the  to page. If unavailable ask to be transferred the Adult Hospitalist Department. DISCHARGE DIAGNOSES DKA/Pneumonia    CONSULTATIONS: DTC    PROCEDURES/SURGERIES: * No surgery found *    PENDING TEST RESULTS:   At the time of discharge the following test results are still pending: none    FOLLOW UP APPOINTMENTS:   PCP    ADDITIONAL CARE RECOMMENDATIONS:   Blood sugar checks atleast twice daily and maintain a diary  Please follow up with PMD    DIET: Diabetic Diet    ACTIVITY: Activity as tolerated      DISCHARGE MEDICATIONS:   See Medication Reconciliation Form    It is important that you take the medication exactly as they are prescribed. Keep your medication in the bottles provided by the pharmacist and keep a list of the medication names, dosages, and times to be taken in your wallet. Do not take other medications without consulting your doctor. NOTIFY YOUR PHYSICIAN FOR ANY OF THE FOLLOWING:   Fever over 101 degrees for 24 hours. Chest pain, shortness of breath, fever, chills, nausea, vomiting, diarrhea, change in mentation, falling, weakness, bleeding. Severe pain or pain not relieved by medications. Or, any other signs or symptoms that you may have questions about.       DISPOSITION:   x Home With:   OT  PT  HH  RN       SNF/Inpatient Rehab/LTAC    Independent/assisted living    Hospice    Other:     CDMP Checked:   Yes x     PROBLEM LIST Updated:  Yes x       Signed:   Lisbet Valenzuela MD  11/29/2022  3:50 PM

## 2022-11-29 NOTE — PROGRESS NOTES
Problem: Diabetes Self-Management  Goal: *Disease process and treatment process  Description: Define diabetes and identify own type of diabetes; list 3 options for treating diabetes. Outcome: Resolved/Met  Goal: *Incorporating nutritional management into lifestyle  Description: Describe effect of type, amount and timing of food on blood glucose; list 3 methods for planning meals. Outcome: Resolved/Met  Goal: *Incorporating physical activity into lifestyle  Description: State effect of exercise on blood glucose levels. Outcome: Resolved/Met  Goal: *Developing strategies to promote health/change behavior  Description: Define the ABC's of diabetes; identify appropriate screenings, schedule and personal plan for screenings. Outcome: Resolved/Met  Goal: *Using medications safely  Description: State effect of diabetes medications on diabetes; name diabetes medication taking, action and side effects. Outcome: Resolved/Met  Goal: *Monitoring blood glucose, interpreting and using results  Description: Identify recommended blood glucose targets  and personal targets. Outcome: Resolved/Met  Goal: *Prevention, detection, treatment of acute complications  Description: List symptoms of hyper- and hypoglycemia; describe how to treat low blood sugar and actions for lowering  high blood glucose level. Outcome: Resolved/Met  Goal: *Prevention, detection and treatment of chronic complications  Description: Define the natural course of diabetes and describe the relationship of blood glucose levels to long term complications of diabetes.   Outcome: Resolved/Met  Goal: *Developing strategies to address psychosocial issues  Description: Describe feelings about living with diabetes; identify support needed and support network  Outcome: Resolved/Met  Goal: *Insulin pump training  Outcome: Resolved/Met  Goal: *Sick day guidelines  Outcome: Resolved/Met  Goal: *Patient Specific Goal (EDIT GOAL, INSERT TEXT)  Outcome: Resolved/Met Problem: Patient Education: Go to Patient Education Activity  Goal: Patient/Family Education  Outcome: Resolved/Met     Problem: Falls - Risk of  Goal: *Absence of Falls  Description: Document Shade Lazbuddie Fall Risk and appropriate interventions in the flowsheet.   Outcome: Resolved/Met  Note: Fall Risk Interventions:            Medication Interventions: Patient to call before getting OOB, Teach patient to arise slowly                   Problem: Patient Education: Go to Patient Education Activity  Goal: Patient/Family Education  Outcome: Resolved/Met     Problem: Heart Failure: Day 1  Goal: Nutrition/Diet  Outcome: Resolved/Met

## 2022-11-29 NOTE — PROGRESS NOTES
Pharmacy renal dose protocol : Enoxaparin  Indication: DVT Prophylaxis  Recent Labs     11/29/22  0319 11/28/22  1433 11/28/22  0906 11/27/22  1410   HGB 14.4  --   --  16.3     --   --  300   CREA 0.73 0.80 0.89 1.26     Current Weight: 150.3 kg  Est. CrCl = >100 ml/min  Current Dose: 40 mg subcutaneously every 12 hours.   Plan: Change to 30 mg subcutaneously every 12 hours for weight and CrCl > 30 ml/min

## 2022-11-29 NOTE — PROGRESS NOTES
The patient was in the hospital from 11/27/2022 to 11/29/2022. The patient can go back to work from 12/3/2022 if he continues to get better. Please call with questions.

## 2022-11-29 NOTE — PROGRESS NOTES
0730: Bedside and Verbal shift change report given to [address] [place] [address] (oncoming nurse) by Janet Gómez (offgoing nurse). Report included the following information SBAR, Kardex, Intake/Output, MAR, and Recent Results. 1800: I have reviewed discharge instructions with the patient and parent. The patient and parent verbalized understanding. Discharge medications reviewed with patient and appropriate educational materials and side effects teaching were provided. Current Discharge Medication List        START taking these medications    Details   azithromycin (ZITHROMAX) 250 mg tablet 1 tab PO daily  Qty: 6 Tablet, Refills: 0  Start date: 11/29/2022, End date: 12/4/2022      cefUROXime (CEFTIN) 500 mg tablet Take 1 Tablet by mouth two (2) times a day. Qty: 10 Tablet, Refills: 0  Start date: 11/29/2022      phosphorus (K PHOS NEUTRAL) 250 mg tablet Take 1 Tablet by mouth two (2) times a day. Qty: 6 Tablet, Refills: 0  Start date: 11/29/2022      Blood-Glucose Meter monitoring kit daily  Qty: 1 Kit, Refills: 0  Start date: 11/29/2022      metFORMIN (GLUCOPHAGE) 500 mg tablet Take 1 Tablet by mouth two (2) times daily (with meals). Qty: 60 Tablet, Refills: 2  Start date: 11/29/2022      insulin NPH (HUMULIN N) 100 unit/mL (3 mL) inpn 20 Units by SubCUTAneous route Before breakfast and dinner. Qty: 1 Pen, Refills: 2  Start date: 11/29/2022           CONTINUE these medications which have NOT CHANGED    Details   buPROPion (WELLBUTRIN) 75 mg tablet Take 75 mg by mouth two (2) times a day.      hydrOXYzine HCL (ATARAX) 50 mg tablet Take 25 mg by mouth two (2) times a day.  Indications: anxious

## 2022-11-29 NOTE — PROGRESS NOTES
Problem: Falls - Risk of  Goal: *Absence of Falls  Description: Document Jair Luisbillviktor Fall Risk and appropriate interventions in the flowsheet.   Outcome: Progressing Towards Goal  Note: Fall Risk Interventions:            Medication Interventions: Teach patient to arise slowly, Bed/chair exit alarm                   Problem: Patient Education: Go to Patient Education Activity  Goal: Patient/Family Education  Outcome: Progressing Towards Goal     Problem: Heart Failure: Day 1  Goal: Nutrition/Diet  Outcome: Progressing Towards Goal

## 2022-11-29 NOTE — PROGRESS NOTES
Update -     1600 - Attending MD requested medications to be filled at OP [facility] pharmacy for patient prior to discharge. Patient with Out of [facility] and not covered by insurance. CM faxed prescriptions. CM spoke with patient's mother - advised we will need prescriptions filled prior to 5PM for simran assistance at discharge. Patient unable to afford new diabetic treatment medications. 1620 - CM called OP pharmacy and spoke martha Santoro who confirmed medications received. No need for glucose monitoring kit per patients mother - they have one at home. Lisa Baird advised they will be able to fill medciations. CM to provide Financial assistance paperwork to pharmacist. CM provided unit number and on call number for CM for pharmacy to call once medications are filled. CM updated [facility] RN to take prescriptions to [facility] pharmacy once notified they are filled.     Steve Quesada, MPH  Care Manager [facility]  Available via [employer] or

## 2022-11-29 NOTE — PROGRESS NOTES
TRANSITION OF CARE  RUR--5%  Disposition--Return to independent functioning. Transport--Family    Patient's primary family contact: mother Grayson Andrews Management Interventions  PCP Verified by CM: No  Transition of Care Consult (CM Consult): Other  Physical Therapy Consult: No  Occupational Therapy Consult: No  Speech Therapy Consult: No  Support Systems: Parent(s)  Confirm Follow Up Transport: Family  The Plan for Transition of Care is Related to the Following Treatment Goals : Return to independent functioning. Discharge Location  Patient Expects to be Discharged to[de-identified] Home    Reason for Admission:  Community acquired pneumonia           Sepsis                   RUR Score:            5%           Plan for utilizing home health:  N/A        PCP: First and Last name:  None  Plan to use [facility] until PCP established. Name of Practice:    Are you a current patient:     Approximate date of last visit:    Can you participate in a virtual visit with your PCP:                     Current Advanced Directive/Advance Care Plan: Full Code    Healthcare Decision Maker:   Click here to complete [address] including selection of the Healthcare Decision Maker Relationship (ie \"Primary\")                         Transition of Care Plan:      CM met with patient. Patient recently moved from [place]. Patient lives with his parents. Patient lives in a  [place] house. Local family support includes relatives in [place], [place], etc.  Patient was ambulatory prior to admission and anticipates return to his new job.

## 2022-11-29 NOTE — PROGRESS NOTES
[facility] Adult  Hospitalist Group                                                                                          Hospitalist Progress Note  Leigh Ann Johnson MD  Answering service: [phone] OR [phone] from in house phone        Date of Service:  2022  NAME:  Kwaku Marino  :  1990  MRN:  [id number]      Admission Summary:   Kwaku Marino is a 29 y.o. male with obesity, former cocaine and cannabis use, has not used in the past 6 months. He also has a history of depression/anxiety. He was previously in [place], recently moved back to [place]. He presents to freestanding emergency department with 4 to 5 days of dyspnea. He was found to be in DKA and further work-up in the emergency department revealed right upper lobe pneumonia as well. CT was consistent with right upper lobe pneumonia, blood sugar was 416 with anion gap of 24. Patient was started on treatment with antibiotics and insulin infusion and referred for admission on the hospitalist service at our facility     At the moment of the initial interview he endorsed cough however denied fever, chills. He denied sore throat however did complain of sinus and right ear discomfort. He has not had medical follow-up, he did not like his providers in [place] and has not had any follow-up since moving to [place] in September. He was previously on insulin, has not taken any in the past 2 years          Interval history / Subjective:    Follow up DKA/Pneumonia  Feels better  No chest pain/SOB/dizziness  Leukocytosis better     Assessment & Plan:     DKA/New onset DM type 2  -now off insulin gtt  -Lantus increased today/SSI  -Appreciate discussion with DTC    Hypokalemia  Hypophosphatemia  Hyponatremia  -replace as needed    Community-acquired pneumonia  Sinusitis  Rhinovirus positive  -Right upper lobe pneumonia as visualized on CT  -Continue Ceftriaxone/Azithromycin    Depression/anxiety: Continuing bupropion and hydroxyzine. stable  Obesity: counseled on diet and exercise      Diabetic diet       Code status: FULL CODE  Prophylaxis: lovenox    Plan: Discharge likely today  Care Plan discussed with: Patient  Anticipated Disposition: home with outpatient follow up     Hospital Problems  Never Reviewed            Codes Class Noted POA    CAP (community acquired pneumonia) ICD-10-CM: J18.9  ICD-9-CM: 132  11/27/2022 Unknown             Review of Systems:   A comprehensive review of systems was negative except for that written in the HPI. Vital Signs:    Last 24hrs VS reviewed since prior progress note. Most recent are:  Visit Vitals  /62 (BP 1 Location: Left upper arm, BP Patient Position: At rest)   Pulse 96   Temp 98.2 °F (36.8 °C)   Resp 18   Ht 6' 6\" (1.981 m)   Wt 150.3 kg (331 lb 5.6 oz)   SpO2 97%   BMI 38.29 kg/m²         Intake/Output Summary (Last 24 hours) at 11/29/2022 1529  Last data filed at 11/29/2022 1123  Gross per 24 hour   Intake 3828.33 ml   Output 2900 ml   Net 928.33 ml        Physical Examination:     I had a face to face encounter with this patient and independently examined them on 11/29/2022 as outlined below:          Constitutional:  No acute distress, cooperative, pleasant    ENT:  Oral mucosa moist, oropharynx benign. Resp:  CTA bilaterally. No wheezing/rhonchi/rales. No accessory muscle use. CV:  Regular rhythm, normal rate, no murmurs, gallops, rubs    GI:  Soft, non distended, non tender. normoactive bowel sounds, no hepatosplenomegaly     Musculoskeletal:  No edema, warm, 2+ pulses throughout    Neurologic:  Moves all extremities.   AAOx3, CN II-XII reviewed            Data Review:    Review and/or order of clinical lab test      Labs:     Recent Labs     11/29/22  0319 11/27/22  1410   WBC 20.5* 25.1*   HGB 14.4 16.3   HCT 41.3 48.6    300     Recent Labs     11/29/22  0319 11/28/22  1447 11/28/22  1433 11/28/22  0906   *  --  133* 136   K 3.2*  --  3.1* 2.7*   CL 101  --  100 100   CO2 23  --  22 23   BUN 15  --  14 14   CREA 0.73  --  0.80 0.89   *  --  154* 170*   CA 8.6  --  9.1 8.5   MG 2.0  --  2.2 1.9   PHOS 1.6* 1.0*  --   --      Recent Labs     11/28/22  1433 11/27/22  1410   ALT  --  40   AP  --  155*   TBILI  --  0.8   TP  --  8.6*   ALB  --  2.8*   GLOB  --  5.8*   LPSE 64*  --      No results for input(s): INR, PTP, APTT, INREXT in the last 72 hours. No results for input(s): FE, TIBC, PSAT, FERR in the last 72 hours. No results found for: FOL, RBCF   No results for input(s): PH, PCO2, PO2 in the last 72 hours. No results for input(s): CPK, CKNDX, TROIQ in the last 72 hours.     No lab exists for component: CPKMB  No results found for: CHOL, CHOLX, CHLST, CHOLV, HDL, HDLP, LDL, LDLC, DLDLP, TGLX, TRIGL, TRIGP, CHHD, CHHDX  Lab Results   Component Value Date/Time    Glucose (POC) 275 (H) 11/29/2022 11:21 AM    Glucose (POC) 231 (H) 11/29/2022 06:38 AM    Glucose (POC) 195 (H) 11/28/2022 09:52 PM    Glucose (POC) 182 (H) 11/28/2022 04:50 PM    Glucose (POC) 182 (H) 11/28/2022 03:50 PM     No results found for: COLOR, APPRN, SPGRU, REFSG, ANNABELLE, PROTU, GLUCU, KETU, BILU, UROU, BEBETO, LEUKU, GLUKE, EPSU, BACTU, WBCU, RBCU, CASTS, UCRY      Medications Reviewed:     Current Facility-Administered Medications   Medication Dose Route Frequency    [START ON 11/30/2022] insulin glargine (LANTUS) injection 40 Units  40 Units SubCUTAneous DAILY    dextrose 10 % infusion 0-250 mL  0-250 mL IntraVENous PRN    insulin lispro (HUMALOG) injection 10 Units  10 Units SubCUTAneous TID WITH MEALS    enoxaparin (LOVENOX) injection 30 mg  30 mg SubCUTAneous Q12H    azithromycin (ZITHROMAX) 500 mg in 0.9% sodium chloride 250 mL ([id number])  500 mg IntraVENous Q24H    cefTRIAXone (ROCEPHIN) 1 g in 0.9% sodium chloride 10 mL IV syringe  1 g IntraVENous Q24H    0.45% sodium chloride infusion  100 mL/hr IntraVENous CONTINUOUS    sodium chloride (NS) flush 5-40 mL  5-40 mL IntraVENous Q8H    sodium chloride (NS) flush 5-40 mL  5-40 mL IntraVENous PRN    acetaminophen (TYLENOL) tablet 650 mg  650 mg Oral Q6H PRN    Or    acetaminophen (TYLENOL) suppository 650 mg  650 mg Rectal Q6H PRN    polyethylene glycol (MIRALAX) packet 17 g  17 g Oral DAILY PRN    promethazine (PHENERGAN) tablet 12.5 mg  12.5 mg Oral Q6H PRN    Or    ondansetron (ZOFRAN) injection 4 mg  4 mg IntraVENous Q6H PRN    insulin lispro (HUMALOG) injection   SubCUTAneous AC&HS    glucose chewable tablet 16 g  4 Tablet Oral PRN    glucagon (GLUCAGEN) injection 1 mg  1 mg IntraMUSCular PRN    HYDROcodone-acetaminophen (NORCO) 5-325 mg per tablet 1 Tablet  1 Tablet Oral Q6H PRN    buPROPion (WELLBUTRIN) tablet 75 mg  75 mg Oral BID    hydrOXYzine HCL (ATARAX) tablet 25 mg  25 mg Oral BID     ______________________________________________________________________  EXPECTED LENGTH OF STAY: 4d 19h  ACTUAL LENGTH OF STAY:          2                 Candida Quesada MD

## 2022-11-30 LAB
L PNEUMO1 AG UR QL IA: NEGATIVE
SPECIMEN SOURCE: NORMAL

## 2022-11-30 NOTE — PROGRESS NOTES
Physician Progress Note      PATIENT:               Dejan Hudson  CSN #:                  [id number]  :                       1990  ADMIT DATE:       2022 1:49 PM  [address] [place] DATE:        2022 6:31 PM  RESPONDING  PROVIDER #:        Lorena Cobb MD          QUERY TEXT:    Dear Attending,    Patient admitted with Fever/PNA. Noted documentation of sepsis in ED note. In order to support the diagnosis of sepsis, please include additional clinical indicators in your documentation. Or please document if the diagnosis of sepsis has been ruled out after further study. The medical record reflects the following:  Risk Factors: CAP  Clinical Indicators: SOB, Temp 102. lactate 2.3, tachy , CXR shows RUL focal consolidation, per ED note \"Treating for Sepsis 2/2 CAP\"  Treatment: ceftriaxone/azithro and IVF    Please call if you have any questions or need assistance. I can also be reached via [facility] or [facility] # [phone]. Thank you,  Lacy Matson, [facility]  O: [phone]  Options provided:  -- Sepsis present as evidenced by, Please document evidence. -- Sepsis was ruled out after study  -- Other - I will add my own diagnosis  -- Disagree - Not applicable / Not valid  -- Disagree - Clinically unable to determine / Unknown  -- Refer to Clinical Documentation Reviewer    PROVIDER RESPONSE TEXT:    Sepsis was ruled out after study. Query created by:  Jessie Vazquez on 2022 5:17 PM      Electronically signed by:  Lorena Cobb MD 2022 9:57 AM

## 2022-12-02 LAB
BACTERIA SPEC CULT: NORMAL
SERVICE CMNT-IMP: NORMAL

## 2023-03-13 ENCOUNTER — APPOINTMENT (OUTPATIENT)
Dept: CT IMAGING | Age: 33
DRG: 638 | End: 2023-03-13
Attending: EMERGENCY MEDICINE
Payer: MEDICAID

## 2023-03-13 ENCOUNTER — HOSPITAL ENCOUNTER (INPATIENT)
Age: 33
LOS: 2 days | Discharge: HOME | DRG: 638 | End: 2023-03-15
Attending: EMERGENCY MEDICINE | Admitting: STUDENT IN AN ORGANIZED HEALTH CARE EDUCATION/TRAINING PROGRAM
Payer: MEDICAID

## 2023-03-13 DIAGNOSIS — E11.10 DIABETIC KETOACIDOSIS WITHOUT COMA ASSOCIATED WITH TYPE 2 DIABETES MELLITUS (HCC): Primary | ICD-10-CM

## 2023-03-13 DIAGNOSIS — E11.65 TYPE 2 DIABETES MELLITUS WITH HYPERGLYCEMIA, WITH LONG-TERM CURRENT USE OF INSULIN (HCC): ICD-10-CM

## 2023-03-13 DIAGNOSIS — Z79.4 TYPE 2 DIABETES MELLITUS WITH HYPERGLYCEMIA, WITH LONG-TERM CURRENT USE OF INSULIN (HCC): ICD-10-CM

## 2023-03-13 DIAGNOSIS — E08.10 DIABETIC KETOACIDOSIS WITHOUT COMA ASSOCIATED WITH DIABETES MELLITUS DUE TO UNDERLYING CONDITION (HCC): ICD-10-CM

## 2023-03-13 DIAGNOSIS — E86.0 DEHYDRATION: ICD-10-CM

## 2023-03-13 LAB
ADMINISTERED INITIALS, ADMINIT: NORMAL
ALBUMIN SERPL-MCNC: 4.6 G/DL (ref 3.5–5)
ALBUMIN/GLOB SERPL: 1.1 (ref 1.1–2.2)
ALP SERPL-CCNC: 125 U/L (ref 45–117)
ALT SERPL-CCNC: 108 U/L (ref 12–78)
ANION GAP SERPL CALC-SCNC: 20 MMOL/L (ref 5–15)
ANION GAP SERPL CALC-SCNC: 6 MMOL/L (ref 5–15)
ANION GAP SERPL CALC-SCNC: 8 MMOL/L (ref 5–15)
AST SERPL-CCNC: 58 U/L (ref 15–37)
BASOPHILS # BLD: 0.3 K/UL (ref 0–0.1)
BASOPHILS NFR BLD: 1 % (ref 0–1)
BILIRUB SERPL-MCNC: 0.5 MG/DL (ref 0.2–1)
BUN SERPL-MCNC: 19 MG/DL (ref 6–20)
BUN SERPL-MCNC: 21 MG/DL (ref 6–20)
BUN SERPL-MCNC: 21 MG/DL (ref 6–20)
BUN/CREAT SERPL: 14 (ref 12–20)
BUN/CREAT SERPL: 17 (ref 12–20)
BUN/CREAT SERPL: 17 (ref 12–20)
CALCIUM SERPL-MCNC: 10 MG/DL (ref 8.5–10.1)
CALCIUM SERPL-MCNC: 8.5 MG/DL (ref 8.5–10.1)
CALCIUM SERPL-MCNC: 8.9 MG/DL (ref 8.5–10.1)
CHLORIDE SERPL-SCNC: 106 MMOL/L (ref 97–108)
CHLORIDE SERPL-SCNC: 107 MMOL/L (ref 97–108)
CHLORIDE SERPL-SCNC: 97 MMOL/L (ref 97–108)
CO2 SERPL-SCNC: 17 MMOL/L (ref 21–32)
CO2 SERPL-SCNC: 23 MMOL/L (ref 21–32)
CO2 SERPL-SCNC: 23 MMOL/L (ref 21–32)
CREAT SERPL-MCNC: 1.12 MG/DL (ref 0.7–1.3)
CREAT SERPL-MCNC: 1.23 MG/DL (ref 0.7–1.3)
CREAT SERPL-MCNC: 1.51 MG/DL (ref 0.7–1.3)
D50 ADMINISTERED, D50ADM: 0 ML
D50 ORDER, D50ORD: 0 ML
DIFFERENTIAL METHOD BLD: ABNORMAL
EOSINOPHIL # BLD: 0 K/UL (ref 0–0.4)
EOSINOPHIL NFR BLD: 0 % (ref 0–7)
ERYTHROCYTE [DISTWIDTH] IN BLOOD BY AUTOMATED COUNT: 12.4 % (ref 11.5–14.5)
EST. AVERAGE GLUCOSE BLD GHB EST-MCNC: 263 MG/DL
GLOBULIN SER CALC-MCNC: 4.3 G/DL (ref 2–4)
GLUCOSE BLD STRIP.AUTO-MCNC: 145 MG/DL (ref 65–117)
GLUCOSE BLD STRIP.AUTO-MCNC: 147 MG/DL (ref 65–117)
GLUCOSE BLD STRIP.AUTO-MCNC: 171 MG/DL (ref 65–117)
GLUCOSE BLD STRIP.AUTO-MCNC: 187 MG/DL (ref 65–117)
GLUCOSE BLD STRIP.AUTO-MCNC: 211 MG/DL (ref 65–117)
GLUCOSE BLD STRIP.AUTO-MCNC: 276 MG/DL (ref 65–117)
GLUCOSE BLD STRIP.AUTO-MCNC: 323 MG/DL (ref 65–117)
GLUCOSE BLD STRIP.AUTO-MCNC: 327 MG/DL (ref 65–117)
GLUCOSE BLD STRIP.AUTO-MCNC: 358 MG/DL (ref 65–117)
GLUCOSE BLD STRIP.AUTO-MCNC: 429 MG/DL (ref 65–117)
GLUCOSE SERPL-MCNC: 177 MG/DL (ref 65–100)
GLUCOSE SERPL-MCNC: 303 MG/DL (ref 65–100)
GLUCOSE SERPL-MCNC: 487 MG/DL (ref 65–100)
GLUCOSE, GLC: 145 MG/DL
GLUCOSE, GLC: 147 MG/DL
GLUCOSE, GLC: 171 MG/DL
GLUCOSE, GLC: 187 MG/DL
GLUCOSE, GLC: 276 MG/DL
GLUCOSE, GLC: 323 MG/DL
GLUCOSE, GLC: 327 MG/DL
GLUCOSE, GLC: 358 MG/DL
GLUCOSE, GLC: 429 MG/DL
HBA1C MFR BLD: 10.8 % (ref 4–5.6)
HCT VFR BLD AUTO: 53.1 % (ref 36.6–50.3)
HGB BLD-MCNC: 18.2 G/DL (ref 12.1–17)
HIGH TARGET, HITG: 200 MG/DL
IMM GRANULOCYTES # BLD AUTO: 0.3 K/UL (ref 0–0.04)
IMM GRANULOCYTES NFR BLD AUTO: 1 % (ref 0–0.5)
INSULIN ADMINSTERED, INSADM: 10.7 UNITS/HOUR
INSULIN ADMINSTERED, INSADM: 14.9 UNITS/HOUR
INSULIN ADMINSTERED, INSADM: 2.6 UNITS/HOUR
INSULIN ADMINSTERED, INSADM: 3.4 UNITS/HOUR
INSULIN ADMINSTERED, INSADM: 3.8 UNITS/HOUR
INSULIN ADMINSTERED, INSADM: 5.3 UNITS/HOUR
INSULIN ADMINSTERED, INSADM: 5.6 UNITS/HOUR
INSULIN ADMINSTERED, INSADM: 6.5 UNITS/HOUR
INSULIN ADMINSTERED, INSADM: 7.4 UNITS/HOUR
INSULIN ORDER, INSORD: 10.7 UNITS/HOUR
INSULIN ORDER, INSORD: 14.9 UNITS/HOUR
INSULIN ORDER, INSORD: 2.6 UNITS/HOUR
INSULIN ORDER, INSORD: 3.4 UNITS/HOUR
INSULIN ORDER, INSORD: 3.8 UNITS/HOUR
INSULIN ORDER, INSORD: 5.3 UNITS/HOUR
INSULIN ORDER, INSORD: 5.6 UNITS/HOUR
INSULIN ORDER, INSORD: 6.5 UNITS/HOUR
INSULIN ORDER, INSORD: 7.4 UNITS/HOUR
LOW TARGET, LOT: 150 MG/DL
LYMPHOCYTES # BLD: 4.5 K/UL (ref 0.8–3.5)
LYMPHOCYTES NFR BLD: 15 % (ref 12–49)
MAGNESIUM SERPL-MCNC: 1.7 MG/DL (ref 1.6–2.4)
MAGNESIUM SERPL-MCNC: 1.7 MG/DL (ref 1.6–2.4)
MAGNESIUM SERPL-MCNC: 1.8 MG/DL (ref 1.6–2.4)
MCH RBC QN AUTO: 29.2 PG (ref 26–34)
MCHC RBC AUTO-ENTMCNC: 34.3 G/DL (ref 30–36.5)
MCV RBC AUTO: 85.1 FL (ref 80–99)
MINUTES UNTIL NEXT BG, NBG: 60 MIN
MONOCYTES # BLD: 2.1 K/UL (ref 0–1)
MONOCYTES NFR BLD: 7 % (ref 5–13)
MULTIPLIER, MUL: 0.02
MULTIPLIER, MUL: 0.02
MULTIPLIER, MUL: 0.03
MULTIPLIER, MUL: 0.04
MULTIPLIER, MUL: 0.04
MULTIPLIER, MUL: 0.05
MULTIPLIER, MUL: 0.05
NEUTS SEG # BLD: 23 K/UL (ref 1.8–8)
NEUTS SEG NFR BLD: 76 % (ref 32–75)
NRBC # BLD: 0 K/UL (ref 0–0.01)
NRBC BLD-RTO: 0 PER 100 WBC
ORDER INITIALS, ORDINIT: NORMAL
PHOSPHATE SERPL-MCNC: 4.2 MG/DL (ref 2.6–4.7)
PLATELET # BLD AUTO: 381 K/UL (ref 150–400)
PMV BLD AUTO: 10.7 FL (ref 8.9–12.9)
POTASSIUM SERPL-SCNC: 3.8 MMOL/L (ref 3.5–5.1)
POTASSIUM SERPL-SCNC: 4.3 MMOL/L (ref 3.5–5.1)
POTASSIUM SERPL-SCNC: 4.4 MMOL/L (ref 3.5–5.1)
PROT SERPL-MCNC: 8.9 G/DL (ref 6.4–8.2)
RBC # BLD AUTO: 6.24 M/UL (ref 4.1–5.7)
RBC MORPH BLD: ABNORMAL
SERVICE CMNT-IMP: ABNORMAL
SODIUM SERPL-SCNC: 134 MMOL/L (ref 136–145)
SODIUM SERPL-SCNC: 135 MMOL/L (ref 136–145)
SODIUM SERPL-SCNC: 138 MMOL/L (ref 136–145)
WBC # BLD AUTO: 30.2 K/UL (ref 4.1–11.1)

## 2023-03-13 PROCEDURE — 74011250637 HC RX REV CODE- 250/637: Performed by: STUDENT IN AN ORGANIZED HEALTH CARE EDUCATION/TRAINING PROGRAM

## 2023-03-13 PROCEDURE — 83036 HEMOGLOBIN GLYCOSYLATED A1C: CPT

## 2023-03-13 PROCEDURE — 82962 GLUCOSE BLOOD TEST: CPT

## 2023-03-13 PROCEDURE — 80053 COMPREHEN METABOLIC PANEL: CPT

## 2023-03-13 PROCEDURE — 74011250636 HC RX REV CODE- 250/636: Performed by: EMERGENCY MEDICINE

## 2023-03-13 PROCEDURE — 99285 EMERGENCY DEPT VISIT HI MDM: CPT

## 2023-03-13 PROCEDURE — 74011000258 HC RX REV CODE- 258: Performed by: EMERGENCY MEDICINE

## 2023-03-13 PROCEDURE — 74011636637 HC RX REV CODE- 636/637: Performed by: EMERGENCY MEDICINE

## 2023-03-13 PROCEDURE — 80048 BASIC METABOLIC PNL TOTAL CA: CPT

## 2023-03-13 PROCEDURE — 84100 ASSAY OF PHOSPHORUS: CPT

## 2023-03-13 PROCEDURE — 74011000250 HC RX REV CODE- 250: Performed by: CLINICAL NURSE SPECIALIST

## 2023-03-13 PROCEDURE — 74011636637 HC RX REV CODE- 636/637: Performed by: STUDENT IN AN ORGANIZED HEALTH CARE EDUCATION/TRAINING PROGRAM

## 2023-03-13 PROCEDURE — 65270000029 HC RM PRIVATE

## 2023-03-13 PROCEDURE — 74011000250 HC RX REV CODE- 250: Performed by: STUDENT IN AN ORGANIZED HEALTH CARE EDUCATION/TRAINING PROGRAM

## 2023-03-13 PROCEDURE — 74011250636 HC RX REV CODE- 250/636: Performed by: STUDENT IN AN ORGANIZED HEALTH CARE EDUCATION/TRAINING PROGRAM

## 2023-03-13 PROCEDURE — 85025 COMPLETE CBC W/AUTO DIFF WBC: CPT

## 2023-03-13 PROCEDURE — 99223 1ST HOSP IP/OBS HIGH 75: CPT | Performed by: CLINICAL NURSE SPECIALIST

## 2023-03-13 PROCEDURE — 74176 CT ABD & PELVIS W/O CONTRAST: CPT

## 2023-03-13 PROCEDURE — 36415 COLL VENOUS BLD VENIPUNCTURE: CPT

## 2023-03-13 PROCEDURE — 83735 ASSAY OF MAGNESIUM: CPT

## 2023-03-13 PROCEDURE — 96375 TX/PRO/DX INJ NEW DRUG ADDON: CPT

## 2023-03-13 PROCEDURE — 96374 THER/PROPH/DIAG INJ IV PUSH: CPT

## 2023-03-13 RX ORDER — INSULIN LISPRO 100 [IU]/ML
INJECTION, SOLUTION INTRAVENOUS; SUBCUTANEOUS
Status: DISCONTINUED | OUTPATIENT
Start: 2023-03-13 | End: 2023-03-14

## 2023-03-13 RX ORDER — IBUPROFEN 200 MG
4 TABLET ORAL AS NEEDED
Status: DISCONTINUED | OUTPATIENT
Start: 2023-03-13 | End: 2023-03-15 | Stop reason: HOSPADM

## 2023-03-13 RX ORDER — INSULIN GLARGINE 100 [IU]/ML
50 INJECTION, SOLUTION SUBCUTANEOUS
Status: DISCONTINUED | OUTPATIENT
Start: 2023-03-13 | End: 2023-03-15

## 2023-03-13 RX ORDER — SODIUM CHLORIDE 0.9 % (FLUSH) 0.9 %
5-40 SYRINGE (ML) INJECTION EVERY 8 HOURS
Status: DISCONTINUED | OUTPATIENT
Start: 2023-03-13 | End: 2023-03-15 | Stop reason: HOSPADM

## 2023-03-13 RX ORDER — ACETAMINOPHEN 325 MG/1
650 TABLET ORAL
Status: DISCONTINUED | OUTPATIENT
Start: 2023-03-13 | End: 2023-03-15 | Stop reason: HOSPADM

## 2023-03-13 RX ORDER — INSULIN LISPRO 100 [IU]/ML
INJECTION, SOLUTION INTRAVENOUS; SUBCUTANEOUS
Status: DISCONTINUED | OUTPATIENT
Start: 2023-03-13 | End: 2023-03-13

## 2023-03-13 RX ORDER — SODIUM CHLORIDE 9 MG/ML
150 INJECTION, SOLUTION INTRAVENOUS CONTINUOUS
Status: DISCONTINUED | OUTPATIENT
Start: 2023-03-13 | End: 2023-03-15 | Stop reason: HOSPADM

## 2023-03-13 RX ORDER — MORPHINE SULFATE 2 MG/ML
2 INJECTION, SOLUTION INTRAMUSCULAR; INTRAVENOUS
Status: DISCONTINUED | OUTPATIENT
Start: 2023-03-13 | End: 2023-03-14

## 2023-03-13 RX ORDER — ONDANSETRON 2 MG/ML
4 INJECTION INTRAMUSCULAR; INTRAVENOUS
Status: CANCELLED | OUTPATIENT
Start: 2023-03-13 | End: 2023-03-13

## 2023-03-13 RX ORDER — MORPHINE SULFATE 2 MG/ML
4 INJECTION, SOLUTION INTRAMUSCULAR; INTRAVENOUS
Status: DISCONTINUED | OUTPATIENT
Start: 2023-03-13 | End: 2023-03-14

## 2023-03-13 RX ORDER — IBUPROFEN 200 MG
4 TABLET ORAL AS NEEDED
Status: DISCONTINUED | OUTPATIENT
Start: 2023-03-13 | End: 2023-03-13

## 2023-03-13 RX ORDER — ACETAMINOPHEN 650 MG/1
650 SUPPOSITORY RECTAL
Status: DISCONTINUED | OUTPATIENT
Start: 2023-03-13 | End: 2023-03-15 | Stop reason: HOSPADM

## 2023-03-13 RX ORDER — SODIUM CHLORIDE 0.9 % (FLUSH) 0.9 %
5-40 SYRINGE (ML) INJECTION AS NEEDED
Status: DISCONTINUED | OUTPATIENT
Start: 2023-03-13 | End: 2023-03-15 | Stop reason: HOSPADM

## 2023-03-13 RX ORDER — DEXTROSE MONOHYDRATE AND SODIUM CHLORIDE 5; .45 G/100ML; G/100ML
125 INJECTION, SOLUTION INTRAVENOUS CONTINUOUS
Status: DISCONTINUED | OUTPATIENT
Start: 2023-03-13 | End: 2023-03-13

## 2023-03-13 RX ORDER — ENOXAPARIN SODIUM 100 MG/ML
40 INJECTION SUBCUTANEOUS EVERY 12 HOURS
Status: DISCONTINUED | OUTPATIENT
Start: 2023-03-13 | End: 2023-03-15 | Stop reason: HOSPADM

## 2023-03-13 RX ORDER — ONDANSETRON 2 MG/ML
4 INJECTION INTRAMUSCULAR; INTRAVENOUS
Status: DISCONTINUED | OUTPATIENT
Start: 2023-03-13 | End: 2023-03-15 | Stop reason: HOSPADM

## 2023-03-13 RX ORDER — ONDANSETRON 2 MG/ML
4 INJECTION INTRAMUSCULAR; INTRAVENOUS ONCE
Status: COMPLETED | OUTPATIENT
Start: 2023-03-13 | End: 2023-03-13

## 2023-03-13 RX ORDER — KETOROLAC TROMETHAMINE 30 MG/ML
30 INJECTION, SOLUTION INTRAMUSCULAR; INTRAVENOUS ONCE
Status: COMPLETED | OUTPATIENT
Start: 2023-03-13 | End: 2023-03-13

## 2023-03-13 RX ORDER — POLYETHYLENE GLYCOL 3350 17 G/17G
17 POWDER, FOR SOLUTION ORAL DAILY PRN
Status: DISCONTINUED | OUTPATIENT
Start: 2023-03-13 | End: 2023-03-15 | Stop reason: HOSPADM

## 2023-03-13 RX ORDER — ONDANSETRON 4 MG/1
4 TABLET, ORALLY DISINTEGRATING ORAL
Status: DISCONTINUED | OUTPATIENT
Start: 2023-03-13 | End: 2023-03-13

## 2023-03-13 RX ORDER — ONDANSETRON 4 MG/1
4 TABLET, ORALLY DISINTEGRATING ORAL
Status: DISCONTINUED | OUTPATIENT
Start: 2023-03-13 | End: 2023-03-15 | Stop reason: HOSPADM

## 2023-03-13 RX ORDER — MORPHINE SULFATE 4 MG/ML
4 INJECTION INTRAVENOUS ONCE
Status: COMPLETED | OUTPATIENT
Start: 2023-03-13 | End: 2023-03-13

## 2023-03-13 RX ADMIN — ACETAMINOPHEN 650 MG: 325 TABLET ORAL at 13:11

## 2023-03-13 RX ADMIN — SODIUM CHLORIDE 150 ML/HR: 9 INJECTION, SOLUTION INTRAVENOUS at 13:11

## 2023-03-13 RX ADMIN — INSULIN GLARGINE 50 UNITS: 100 INJECTION, SOLUTION SUBCUTANEOUS at 18:00

## 2023-03-13 RX ADMIN — DEXTROSE AND SODIUM CHLORIDE 125 ML/HR: 5; 450 INJECTION, SOLUTION INTRAVENOUS at 16:13

## 2023-03-13 RX ADMIN — ONDANSETRON HYDROCHLORIDE 4 MG: 2 SOLUTION INTRAMUSCULAR; INTRAVENOUS at 13:11

## 2023-03-13 RX ADMIN — ONDANSETRON 4 MG: 4 TABLET, ORALLY DISINTEGRATING ORAL at 23:50

## 2023-03-13 RX ADMIN — MORPHINE SULFATE 4 MG: 4 INJECTION INTRAVENOUS at 11:14

## 2023-03-13 RX ADMIN — Medication 7.4 UNITS/HR: at 10:10

## 2023-03-13 RX ADMIN — ENOXAPARIN SODIUM 40 MG: 100 INJECTION SUBCUTANEOUS at 14:45

## 2023-03-13 RX ADMIN — ACETAMINOPHEN 650 MG: 325 TABLET ORAL at 17:30

## 2023-03-13 RX ADMIN — SODIUM CHLORIDE 1000 ML: 900 INJECTION, SOLUTION INTRAVENOUS at 08:20

## 2023-03-13 RX ADMIN — Medication 2 UNITS: at 22:00

## 2023-03-13 RX ADMIN — ONDANSETRON HYDROCHLORIDE 4 MG: 2 SOLUTION INTRAMUSCULAR; INTRAVENOUS at 11:14

## 2023-03-13 RX ADMIN — SODIUM CHLORIDE, PRESERVATIVE FREE 10 ML: 5 INJECTION INTRAVENOUS at 22:02

## 2023-03-13 RX ADMIN — MORPHINE SULFATE 4 MG: 2 INJECTION, SOLUTION INTRAMUSCULAR; INTRAVENOUS at 15:21

## 2023-03-13 RX ADMIN — KETOROLAC TROMETHAMINE 30 MG: 30 INJECTION, SOLUTION INTRAMUSCULAR at 08:40

## 2023-03-13 RX ADMIN — ONDANSETRON HYDROCHLORIDE 4 MG: 2 SOLUTION INTRAMUSCULAR; INTRAVENOUS at 08:46

## 2023-03-13 NOTE — H&P
History & Physical    Primary Care Provider: None  Source of Information: Patient and chart review    History of Presenting Illness:   Charly Yeboah is a 34 y.o. male with history of morbid obesity, major depressive disorder insulin-dependent type 2 diabetes who presented to emergency room with complaints of abdominal pain, nausea and vomiting. Had been in his usual state of health until this morning when he woke up with GI symptoms. No sick contacts but has a 11year-old daughter who goes to . Admits to previous such episodes and admissions for HHS/DKA. The patient denies any fever, chills, chest pain, nausea, cough, congestion, recent illness, palpitations, or dysuria. Remarkable vitals on ER Presentation: h to 136, bp 150/122  Labs Remarkable for: glu 487, cr 1.5, alt 108, ast 58, ag 20  ER Images: ct abd et pelv: hepatic steatosis  ER Rx: glucostabilizer     Review of Systems:  Pertinent items are noted in the History of Present Illness. Past Medical History:   Diagnosis Date    Depression     Diabetes ([facility] [address]       Past Surgical History:   Procedure Laterality Date    HX APPENDECTOMY       Prior to Admission medications    Medication Sig Start Date End Date Taking? Authorizing Provider   cefUROXime (CEFTIN) 500 mg tablet Take 1 Tablet by mouth two (2) times a day. 11/29/22   Alida Apley, MD   phosphorus (K PHOS NEUTRAL) 250 mg tablet Take 1 Tablet by mouth two (2) times a day. 11/29/22   Alida Apley, MD   Blood-Glucose Meter monitoring kit daily 11/29/22   Alida Apley, MD   metFORMIN (GLUCOPHAGE) 500 mg tablet Take 1 Tablet by mouth two (2) times daily (with meals). 11/29/22   Alida Apley, MD   insulin NPH (HUMULIN N) 100 unit/mL (3 mL) inpn 20 Units by SubCUTAneous route Before breakfast and dinner. 11/29/22   Alida Apley, MD   buPROPion [facility]) 75 mg tablet Take 75 mg by mouth two (2) times a day.     Other, MD Beatris   hydrOXYzine HCL (ATARAX) 50 mg tablet Take 25 mg by mouth two (2) times a day. Indications: anxious    Other, MD Beatris     No Known Allergies   No family history on file. SOCIAL HISTORY:  Patient resides:  Independently x   Assisted Living    SNF    With family care       Smoking history:   None x   Former    Chronic      Alcohol history:   None x   Social    Chronic      Ambulates:   Independently x   w/cane    w/walker    w/wc    CODE STATUS:  DNR    Full x   Other      Objective:     Physical Exam:     Visit Vitals  BP (!) 148/94 (BP 1 Location: Left upper arm, BP Patient Position: At rest)   Pulse 100   Temp 97.9 °F (36.6 °C)   Resp 13   SpO2 94%      O2 Device: None (Room air)    General:  Alert, cooperative, no distress, appears stated age. Head:  Normocephalic, without obvious abnormality, atraumatic. Eyes:  Conjunctivae/corneas clear. PERRL, EOMs intact. Nose: Nares normal. Septum midline. Mucosa normal.        Neck: Supple, symmetrical, trachea midline       Lungs:   Clear to auscultation bilaterally. Chest wall:  No tenderness or deformity. Heart:  Regular rate and rhythm, S1, S2 normal   Abdomen:   Soft, non-tender. Bowel sounds normal. No masses,  No organomegaly. Extremities: Extremities normal, atraumatic, no cyanosis or edema. Pulses: 2+ and symmetric all extremities. Skin: Skin color, texture, turgor normal. No rashes or lesions   Neurologic: CNII-XII intact. Data Review:     Recent Days:  Recent Labs     03/13/23  0824   WBC 30.2*   HGB 18.2*   HCT 53.1*        Recent Labs     03/13/23  0824   *   K 4.3   CL 97   CO2 17*   *   BUN 21*   CREA 1.51*   CA 10.0   MG 1.7   PHOS 4.2   ALB 4.6   *     No results for input(s): PH, PCO2, PO2, HCO3, FIO2 in the last 72 hours.     24 Hour Results:  Recent Results (from the past 24 hour(s))   METABOLIC PANEL, COMPREHENSIVE    Collection Time: 03/13/23  8:24 AM   Result Value Ref Range    Sodium 134 (L) 136 - 145 mmol/L    Potassium 4.3 3.5 - 5.1 mmol/L    Chloride 97 97 - 108 mmol/L    CO2 17 (L) 21 - 32 mmol/L    Anion gap 20 (H) 5 - 15 mmol/L    Glucose 487 (H) 65 - 100 mg/dL    BUN 21 (H) 6 - 20 MG/DL    Creatinine 1.51 (H) 0.70 - 1.30 MG/DL    BUN/Creatinine ratio 14 12 - 20      eGFR >60 >60 ml/min/1.73m2    Calcium 10.0 8.5 - 10.1 MG/DL    Bilirubin, total 0.5 0.2 - 1.0 MG/DL    ALT (SGPT) 108 (H) 12 - 78 U/L    AST (SGOT) 58 (H) 15 - 37 U/L    Alk. phosphatase 125 (H) 45 - 117 U/L    Protein, total 8.9 (H) 6.4 - 8.2 g/dL    Albumin 4.6 3.5 - 5.0 g/dL    Globulin 4.3 (H) 2.0 - 4.0 g/dL    A-G Ratio 1.1 1.1 - 2.2     CBC WITH AUTOMATED DIFF    Collection Time: 03/13/23  8:24 AM   Result Value Ref Range    WBC 30.2 (H) 4.1 - 11.1 K/uL    RBC 6.24 (H) 4.10 - 5.70 M/uL    HGB 18.2 (H) 12.1 - 17.0 g/dL    HCT 53.1 (H) 36.6 - 50.3 %    MCV 85.1 80.0 - 99.0 FL    MCH 29.2 26.0 - 34.0 PG    MCHC 34.3 30.0 - 36.5 g/dL    RDW 12.4 11.5 - 14.5 %    PLATELET 345 305 - 828 K/uL    MPV 10.7 8.9 - 12.9 FL    NRBC 0.0 0  WBC    ABSOLUTE NRBC 0.00 0.00 - 0.01 K/uL    NEUTROPHILS 76 (H) 32 - 75 %    LYMPHOCYTES 15 12 - 49 %    MONOCYTES 7 5 - 13 %    EOSINOPHILS 0 0 - 7 %    BASOPHILS 1 0 - 1 %    IMMATURE GRANULOCYTES 1 (H) 0.0 - 0.5 %    ABS. NEUTROPHILS 23.0 (H) 1.8 - 8.0 K/UL    ABS. LYMPHOCYTES 4.5 (H) 0.8 - 3.5 K/UL    ABS. MONOCYTES 2.1 (H) 0.0 - 1.0 K/UL    ABS. EOSINOPHILS 0.0 0.0 - 0.4 K/UL    ABS. BASOPHILS 0.3 (H) 0.0 - 0.1 K/UL    ABS. IMM.  GRANS. 0.3 (H) 0.00 - 0.04 K/UL    DF SMEAR SCANNED      RBC COMMENTS NORMOCYTIC, NORMOCHROMIC     MAGNESIUM    Collection Time: 03/13/23  8:24 AM   Result Value Ref Range    Magnesium 1.7 1.6 - 2.4 mg/dL   PHOSPHORUS    Collection Time: 03/13/23  8:24 AM   Result Value Ref Range    Phosphorus 4.2 2.6 - 4.7 MG/DL   GLUCOSE, POC    Collection Time: 03/13/23  9:59 AM   Result Value Ref Range    Glucose (POC) 429 (H) 65 - 117 mg/dL    Performed by Jesse Ontiveros RN    [facility]    Collection Time: 03/13/23 10:04 AM   Result Value Ref Range    Glucose 429 mg/dL    Insulin order 7.4 units/hour    Insulin adminstered 7.4 units/hour    Multiplier 0.020     Low target 150 mg/dL    High target 200 mg/dL    D50 order 0.0 ml    D50 administered 0.00 ml    Minutes until next BG 60 min    Order initials LG     Administered initials LG    GLUCOSE, POC    Collection Time: 03/13/23 11:07 AM   Result Value Ref Range    Glucose (POC) 323 (H) 65 - 117 mg/dL    Performed by Gilson Lee RN    GLUCOSTABILIZER    Collection Time: 03/13/23 11:09 AM   Result Value Ref Range    Glucose 323 mg/dL    Insulin order 5.3 units/hour    Insulin adminstered 5.3 units/hour    Multiplier 0.020     Low target 150 mg/dL    High target 200 mg/dL    D50 order 0.0 ml    D50 administered 0.00 ml    Minutes until next BG 60 min    Order initials LG     Administered initials LG    GLUCOSE, POC    Collection Time: 03/13/23 12:35 PM   Result Value Ref Range    Glucose (POC) 276 (H) 65 - 117 mg/dL    Performed by Alicia Finch    Collection Time: 03/13/23 12:39 PM   Result Value Ref Range    Glucose 276 mg/dL    Insulin order 6.5 units/hour    Insulin adminstered 6.5 units/hour    Multiplier 0.030     Low target 150 mg/dL    High target 200 mg/dL    D50 order 0.0 ml    D50 administered 0.00 ml    Minutes until next BG 60 min    Order initials km     Administered initials km          Imaging:     Assessment:     Preet Saenz is a 34 y.o. male with history of morbid obesity, major depressive disorder insulin-dependent type 2 diabetes who is admitted for HHS.        Plan:       HHS / DM III  -Continue glucose stabilizer  -Transition to subcu insulin once anion gap closed x2  -Clear liquid diet  -Diabetes management consult  -MIVF    MAYELA  -Likely of prerenal etiology  -Monitor with volume resuscitation    MDD  -Continue home meds      Obesity  -Counseled on weight loss, dieting and exercise        FEN/GI - [email or web address]  Activity - as tolerated  DVT prophylaxis - lovenox  GI prophylaxis -  NI  Disposition - Home    CODE STATUS:   full code       Signed By: Erum Mccarty MD     March 13, 2023

## 2023-03-13 NOTE — DIABETES MGMT
[address] [place] [address]  DIABETES MANAGEMENT CONSULT    Consulted by  Daina Booth MD   for advanced nursing evaluation and care for inpatient blood glucose management. Evaluation and Action Plan   Preet Saenz is a 34year old gentleman, with Type 2 Diabetes on Lantus, Novolog and Metformin, who is admitted in DKA. This is his second admission for DKA (last ) and on previous discharge, he was given a one month supply of Lantus (1 vial) later obtained metformin and 1 vial of Novolog at a local free clinic. He has been using the same vial of Lantus and Novolog for the last three months and intermittently injecting as he had a limited supply if insulin syringes with needles. He did not know that each insulin vial is good for 28 days. He does not check glucose values to know if he experienced changes in glucose pattern over the last several days. Suspect that a major contributor to DKA has been intermittently injecting  insulin. His AG was closed on the last set of labs. If gap remains closed on the next set of routine labs, can transition off the insulin gtt. Management Rationale Action Plan   Continue insulin gtt per glucostabilizer   Will order D5 1/2 NS- please start when BG under 250 while on insulin gtt  When AG closed x2, can transition off the insulin gtt. Medication   Basal needs Using 0.3 units/kg/D Lantus 50 units. Give the first dose then turn off the insulin gtt 2 hrs later. Nutritional needs Using moderate sensitivity Please order when diet advanced. Humalo units Humalog with each consumed meal.     Hold if patient is NPO or consumes less than 50% of carbohydrates on meal tray   Corrective insulin Using resistant sensitivity    Additional orders  Diet advancement per primary team.  When advanced, please include consistent carbohydrate component of diet (60 grams CHO/meal)- this can be added to clear and full liq diets.      When insulin gtt is D/C: Stop dextrose IVF    Social work consult: Determine if his medicaid application is complete. Assistance with Hortencia Nichole with [employer]. Assistance to ensure he has diabetes supplies (insulin, insulin syringes with needles, glucometer kit) on d/c. Diabetes Discharge Plan   Medication  Metformin needs to stop as liver enzymes are elevated  He needs a new vial of basal/bolus insulin every 28 days. He also needs these immediately on discharge. He is cash pay. If he is not able to obtain medications or supplies through [facility] or here, he needs to go to [facility] ($25 for a Relion Insulin vial)   Referral  [x]        Outpatient diabetes education   Additional orders  Prescription for glucometer kit (Meter, Lancets (100), Strips (100)). Patient to obtain a blood glucose reading four times daily. First thing in the morning prior to eating and drinking anything then before lunch, dinner and bedtime. Create a log and present to PCP for interpretation. He in interested in a CGM, this would be a good option to him but he doesn't have insurance and can't afford the out of pocket cost.          Initial Presentation   Tuan Mayer is a 34 y.o. male who presented to the ED 3/13/23 with a few hr c/o abdominal pain, vomiting, diarrhea and cough. LAB: WBC 30.2, AG 20, , , AST 58, Alk Phos 125. Lipase 64  CXR: Dense airspace disease right upper lobe. Recommend follow-up to ensure  resolution     Chest CT:   1. Hepatic steatosis. 2. Multiple tiny pulmonary nodules are unchanged compared to November 2022. If  the patient is judged high risk for lung cancer (smoking history, history of  other malignancy, etc.) recommend follow-up CT in 12 months to assess for  stability. If the patient is judged low risk, no further follow-up is required. 3. Multiple levels of spinal canal narrowing as detailed above, most severe at  T8-9.       HX:   Past Medical History:   Diagnosis Date    Depression Diabetes ([facility]     Polysubstance Abuse  Anxiety    INITIAL DX:   DKA (diabetic ketoacidosis) ([facility] [E11.10]     Current Treatment     TX: IVF, Insulin gtt. Hospital Course   Clinical progress has been uncomplicated. 3/13: Admission   Diabetes History   Diagnosed with Type 2 Diabetes 6 years ago. Was started on metformin and insulin therapy at diagnosis (doses and insulin type unknown) when in [place]. Took medications for about 6 wks. Was frustrated with elevated BG values and stopped taking medicine. Did not like his provider and stopped going for follow-up care. Hospital admission 11/22 with DKA in the setting of CAP- Discharged home on metformin and NPH (cash pay)    Diabetes-related Medical History  Acute complications  DKA  Neurological complications  Peripheral neuropathy  Other associated conditions     Depression    Diabetes Medication History  Key Antihyperglycemic Medications               metFORMIN (GLUCOPHAGE) 500 mg tablet Take 1 Tablet by mouth two (2) times daily (with meals). insulin NPH (HUMULIN N) 100 unit/mL (3 mL) inpn 20 Units by SubCUTAneous route Before breakfast and dinner. Diabetes self-management practices:   Eating pattern   [x] Breakfast  Taco bell crunch wrap or 2 eggs  [x] Lunch   May eat lunch 3-4 times a week. 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 [] Pneumonia:   There is no immunization history on file for this patient.   [] Hepatitis:   There is no immunization history on file for this patient. Social determinants of health impacting diabetes self-management practices   Struggling with anxiety and/or depression and Concerned that you need to know more about how to stay healthy with diabetes  Overall evaluation:    [x] Not achieving A1c target with drug therapy & self-care practices    Current Vape use  Hx cocaine use (last 6/22)  Was homeless in [place], now back in [place] living with mom last fall  Legally  and has a 11year old daughter living with him  Works full time at a [employer] in [place]  Regular marijuana use    Subjective   I am taking my medicine. I stopped drinking the sugar drinks.      Objective   Physical exam  General Morbidly Obese white male in no acute distress/ill-appearing. Conversant and cooperative  Neuro  Alert, oriented   Vital Signs Visit Vitals  /84 (BP 1 Location: Left upper arm, BP Patient Position: At rest)   Pulse (!) 105   Temp 99 °F (37.2 °C)   Resp 19   Wt (!) 159 kg (350 lb 8 oz)   SpO2 97%   BMI 40.50 kg/m²     Skin  Warm and dry. Acanthosis noted along neckline. No lipohypertrophy or lipoatrophy noted at injection sites   Heart   Regular rate and rhythm.  No murmurs, rubs or gallops  Lungs  Clear to auscultation without rales or rhonchi  Extremities No foot wounds     Laboratory  Recent Labs     03/13/23  1238 03/13/23  0824   * 487*   AGAP 6 20*   WBC  --  30.2*   CREA 1.23 1.51*   AST  --  58*   ALT  --  108*       Factors impacting BG management  Factor Dose Comments   Nutrition:  Standard meals     Clear liquid diet    Other:   Kidney function  Liver function GFR >60    Elevated LFTs      Blood glucose pattern    Significant diabetes-related events over the past 24-72 hours  A1C 10.8% (down from 12% 11/28/22)  UDS + opiates   Last admission: Lantus 45 units SQ, Humalog 10 units with meals    Assessment and Nursing Intervention   Nursing Diagnosis Risk for unstable blood glucose pattern   Nursing Intervention Domain 4176 Decision-making Support   Nursing Interventions Examined current inpatient diabetes/blood glucose control   Explored factors facilitating and impeding inpatient management  Explored corrective strategies with patient and responsible inpatient provider   Informed patient of rational for insulin strategy while hospitalized     Nursing Diagnosis 36807 Ineffective Health Management   Nursing Intervention Domain 5252 Decision-making Support   Nursing Interventions Identified diabetes self-management practices impeding diabetes control  Discussed diabetes survival skills related to  Healthy Plate eating plan; given handouts  Role of physical activity in improving insulin sensitivity and action  Procedure for blood glucose monitoring & options for low-cost products  Medications plan at discharge     Billing Code(s)   [x] 33070  initial hospital care - 75 minutes     Before making these care recommendations, I personally reviewed the hospitalization record, including notes, laboratory & diagnostic data and current medications, and examined the patient at the bedside (circumstances permitting) before determining care. More than fifty (50) percent of the time was spent in patient counseling and/or care coordination.   Total minutes: 75    SONDRA Munoz Aas  Diabetes Clinical Nurse Specialist  [facility]  Access via [employer]

## 2023-03-13 NOTE — PROGRESS NOTES
Pharmacist Review and Automatic Dose Adjustment of Prophylactic Enoxaparin      The reviewing pharmacist has made an adjustment to the ordered enoxaparin dose or converted to UFH per the approved [facility] protocol and table as identified below. May Walker is a 34 y.o. male. No lab exists for component: CREATININE    Estimated Creatinine Clearance: 118.7 mL/min (A) (based on SCr of 1.51 mg/dL (H)).     Height:   Ht Readings from Last 1 Encounters:   11/27/22 198.1 cm (78\")     Weight:  Wt Readings from Last 1 Encounters:   03/13/23 (!) 159 kg (350 lb 8 oz)               Plan: Based upon the patient's weight and renal function, the ordered enoxaparin dose of 40mg daily has been changed/converted to 40 mg Q12H      Thank you,  Teena Johnston, PHARMD

## 2023-03-13 NOTE — ED NOTES
TRANSFER - OUT REPORT:    Verbal report given to Elvira Dangelo RN(name) on Ida Murcia  being transferred to [facility]) for routine progression of care       Report consisted of patients Situation, Background, Assessment and   Recommendations(SBAR). Information from the following report(s) SBAR, ED Summary, MAR, Recent Results, and Cardiac Rhythm Sinus Tach  was reviewed with the receiving nurse. Lines:   Peripheral IV 03/13/23 Right Antecubital (Active)        Opportunity for questions and clarification was provided. Patient transported with:   Monitor      [facility] on scene to transport patient. Verbal report given to [facility]. [facility] in possession of EMTALA, ED Summary, ED facesheet. Patient is resting comfortably on stretcher.

## 2023-03-13 NOTE — ED NOTES
Patient provided with 16 oz of water. Patient has drank and tolerated 8 oz of water so far. Patient instructed to keep arm straight for IV patency. Coband applied to IV site to assist with IV patency. IV site shows no signs and/or patient expresses no complaints in regard to IV infiltration at this time.

## 2023-03-13 NOTE — PROGRESS NOTES
Transition of Care Plan:    RUR:9%  Disposition:home with family and financial assistance with prescriptions   Follow up appointments:to be scheduled  DME needed:diabetic supplies  Transportation at [facility] [address] or means to access home: yes       IM Medicare Letter:n/a  Is patient a  and connected with the [place]?   no            Caregiver Contact:mother [address]  Discharge Caregiver contacted prior to discharge? no  Care Conference needed?:no                     Reason for Admission:  DKA                     RUR Score: 9%                    Plan for utilizing home health:    not recommended       PCP: First and Last name:  None     Name of Practice:    Are you a current patient: Yes/No:    Approximate date of last visit:    Can you participate in a virtual visit with your PCP:                     Current Advanced Directive/Advance Care Plan: Full Code      Healthcare Decision Maker:   Click here to complete [address] including selection of the Healthcare Decision Maker Relationship (ie \"Primary\")                             Transition of Care Plan:                        Patient lives with his parents and is independent in his ADLs/IADLs. Patient does not have a PCP and has out of state insurance. Patient may need financial assistance with obtaining insulin/prescriptions/diabetic supplies as he has out of state Medicaid and it will not cover these. Patient does not have a particular pharmacy. Patient recently moved here from [place]. Care Management Interventions  PCP Verified by CM: Yes  Palliative Care Criteria Met (RRAT>21 & CHF Dx)?: No  Mode of Transport at Discharge:  Other (see comment) (family)  Transition of Care Consult (CM Consult): Discharge Planning  MyChart Signup: No  Discharge Durable Medical Equipment: No  Health Maintenance Reviewed: Yes  Physical Therapy Consult: No  Occupational Therapy Consult: No  Speech Therapy Consult: No  Support Systems: Parent(s)  Confirm Follow Up Transport: Self   Resource Information Provided?: No  Discharge Location  Patient Expects to be Discharged to[de-identified] Home with family assistance      6:11 PM  HUMBERTO Vaughan

## 2023-03-13 NOTE — ED TRIAGE NOTES
Triage Note: Patient arrives to ER complaining of generalized abdominal vomiting, diarrhea, and cough starting this morning  Patient reports he has noted blood in his cough since this morning. Denies exposure to any one sick. Patient is ambulatory to room.

## 2023-03-13 NOTE — PROGRESS NOTES
1230 Pt received as transfer from [facility] ED  Started on glucostabilizer and NS gtt  Transitioned to D5 gtt when BGL <250    Anion gap closedX2, SubQ lantus given  Will d/c insulin gtt tonight    PRN APAP and morphine for pain control    PRN zofran for nausea    1800 SubQ lantus given    2000 d/c insulin gtt and glucostabilizer

## 2023-03-14 LAB
ALBUMIN SERPL-MCNC: 3.4 G/DL (ref 3.5–5)
ALBUMIN/GLOB SERPL: 0.9 (ref 1.1–2.2)
ALP SERPL-CCNC: 83 U/L (ref 45–117)
ALT SERPL-CCNC: 82 U/L (ref 12–78)
ANION GAP SERPL CALC-SCNC: 8 MMOL/L (ref 5–15)
AST SERPL-CCNC: 42 U/L (ref 15–37)
BASOPHILS # BLD: 0.1 K/UL (ref 0–0.1)
BASOPHILS NFR BLD: 1 % (ref 0–1)
BILIRUB DIRECT SERPL-MCNC: 0.1 MG/DL (ref 0–0.2)
BILIRUB SERPL-MCNC: 0.5 MG/DL (ref 0.2–1)
BUN SERPL-MCNC: 14 MG/DL (ref 6–20)
BUN/CREAT SERPL: 15 (ref 12–20)
CALCIUM SERPL-MCNC: 8.7 MG/DL (ref 8.5–10.1)
CHLORIDE SERPL-SCNC: 105 MMOL/L (ref 97–108)
CO2 SERPL-SCNC: 25 MMOL/L (ref 21–32)
CREAT SERPL-MCNC: 0.94 MG/DL (ref 0.7–1.3)
DIFFERENTIAL METHOD BLD: ABNORMAL
EOSINOPHIL # BLD: 0.2 K/UL (ref 0–0.4)
EOSINOPHIL NFR BLD: 2 % (ref 0–7)
ERYTHROCYTE [DISTWIDTH] IN BLOOD BY AUTOMATED COUNT: 12.6 % (ref 11.5–14.5)
GLOBULIN SER CALC-MCNC: 3.7 G/DL (ref 2–4)
GLUCOSE BLD STRIP.AUTO-MCNC: 174 MG/DL (ref 65–117)
GLUCOSE BLD STRIP.AUTO-MCNC: 209 MG/DL (ref 65–117)
GLUCOSE BLD STRIP.AUTO-MCNC: 226 MG/DL (ref 65–117)
GLUCOSE BLD STRIP.AUTO-MCNC: 230 MG/DL (ref 65–117)
GLUCOSE SERPL-MCNC: 299 MG/DL (ref 65–100)
HCT VFR BLD AUTO: 48.8 % (ref 36.6–50.3)
HGB BLD-MCNC: 15.9 G/DL (ref 12.1–17)
IMM GRANULOCYTES # BLD AUTO: 0 K/UL (ref 0–0.04)
IMM GRANULOCYTES NFR BLD AUTO: 0 % (ref 0–0.5)
LYMPHOCYTES # BLD: 3.7 K/UL (ref 0.8–3.5)
LYMPHOCYTES NFR BLD: 38 % (ref 12–49)
MCH RBC QN AUTO: 28.4 PG (ref 26–34)
MCHC RBC AUTO-ENTMCNC: 32.6 G/DL (ref 30–36.5)
MCV RBC AUTO: 87.3 FL (ref 80–99)
MONOCYTES # BLD: 0.8 K/UL (ref 0–1)
MONOCYTES NFR BLD: 8 % (ref 5–13)
NEUTS SEG # BLD: 5 K/UL (ref 1.8–8)
NEUTS SEG NFR BLD: 51 % (ref 32–75)
NRBC # BLD: 0 K/UL (ref 0–0.01)
NRBC BLD-RTO: 0 PER 100 WBC
PLATELET # BLD AUTO: 324 K/UL (ref 150–400)
PMV BLD AUTO: 9.9 FL (ref 8.9–12.9)
POTASSIUM SERPL-SCNC: 4.2 MMOL/L (ref 3.5–5.1)
PROT SERPL-MCNC: 7.1 G/DL (ref 6.4–8.2)
RBC # BLD AUTO: 5.59 M/UL (ref 4.1–5.7)
SERVICE CMNT-IMP: ABNORMAL
SODIUM SERPL-SCNC: 138 MMOL/L (ref 136–145)
WBC # BLD AUTO: 9.8 K/UL (ref 4.1–11.1)

## 2023-03-14 PROCEDURE — 74011636637 HC RX REV CODE- 636/637: Performed by: CLINICAL NURSE SPECIALIST

## 2023-03-14 PROCEDURE — 74011000250 HC RX REV CODE- 250: Performed by: STUDENT IN AN ORGANIZED HEALTH CARE EDUCATION/TRAINING PROGRAM

## 2023-03-14 PROCEDURE — 36415 COLL VENOUS BLD VENIPUNCTURE: CPT

## 2023-03-14 PROCEDURE — 82962 GLUCOSE BLOOD TEST: CPT

## 2023-03-14 PROCEDURE — 85025 COMPLETE CBC W/AUTO DIFF WBC: CPT

## 2023-03-14 PROCEDURE — 80076 HEPATIC FUNCTION PANEL: CPT

## 2023-03-14 PROCEDURE — 74011636637 HC RX REV CODE- 636/637: Performed by: STUDENT IN AN ORGANIZED HEALTH CARE EDUCATION/TRAINING PROGRAM

## 2023-03-14 PROCEDURE — 99233 SBSQ HOSP IP/OBS HIGH 50: CPT | Performed by: CLINICAL NURSE SPECIALIST

## 2023-03-14 PROCEDURE — 80048 BASIC METABOLIC PNL TOTAL CA: CPT

## 2023-03-14 PROCEDURE — 74011250636 HC RX REV CODE- 250/636: Performed by: STUDENT IN AN ORGANIZED HEALTH CARE EDUCATION/TRAINING PROGRAM

## 2023-03-14 PROCEDURE — 65270000029 HC RM PRIVATE

## 2023-03-14 RX ORDER — CALCIUM CARB/VITAMIN D3/VIT K1 500-100-40
1 TABLET,CHEWABLE ORAL
Qty: 120 EACH | Refills: 0 | Status: SHIPPED | OUTPATIENT
Start: 2023-03-14

## 2023-03-14 RX ORDER — MORPHINE SULFATE 2 MG/ML
4 INJECTION, SOLUTION INTRAMUSCULAR; INTRAVENOUS
Status: DISCONTINUED | OUTPATIENT
Start: 2023-03-14 | End: 2023-03-15 | Stop reason: HOSPADM

## 2023-03-14 RX ORDER — IBUPROFEN 200 MG
CAPSULE ORAL
Qty: 100 STRIP | Refills: 0 | Status: SHIPPED | OUTPATIENT
Start: 2023-03-14

## 2023-03-14 RX ORDER — INSULIN LISPRO 100 [IU]/ML
INJECTION, SOLUTION INTRAVENOUS; SUBCUTANEOUS
Status: DISCONTINUED | OUTPATIENT
Start: 2023-03-14 | End: 2023-03-15 | Stop reason: HOSPADM

## 2023-03-14 RX ORDER — MORPHINE SULFATE 2 MG/ML
2 INJECTION, SOLUTION INTRAMUSCULAR; INTRAVENOUS
Status: DISCONTINUED | OUTPATIENT
Start: 2023-03-14 | End: 2023-03-15 | Stop reason: HOSPADM

## 2023-03-14 RX ORDER — INSULIN LISPRO 100 [IU]/ML
12 INJECTION, SOLUTION INTRAVENOUS; SUBCUTANEOUS
Status: DISCONTINUED | OUTPATIENT
Start: 2023-03-14 | End: 2023-03-14

## 2023-03-14 RX ORDER — INSULIN PUMP SYRINGE, 3 ML
EACH MISCELLANEOUS
Qty: 1 KIT | Refills: 0 | Status: SHIPPED | OUTPATIENT
Start: 2023-03-14

## 2023-03-14 RX ORDER — INSULIN LISPRO 100 [IU]/ML
15 INJECTION, SOLUTION INTRAVENOUS; SUBCUTANEOUS
Status: DISCONTINUED | OUTPATIENT
Start: 2023-03-14 | End: 2023-03-15

## 2023-03-14 RX ORDER — INSULIN ASPART 100 [IU]/ML
INJECTION, SOLUTION INTRAVENOUS; SUBCUTANEOUS
Qty: 10 ML | Refills: 0 | Status: SHIPPED | OUTPATIENT
Start: 2023-03-14

## 2023-03-14 RX ORDER — INSULIN GLARGINE 100 [IU]/ML
INJECTION, SOLUTION SUBCUTANEOUS
Qty: 1 ML | Refills: 0 | Status: SHIPPED | OUTPATIENT
Start: 2023-03-14

## 2023-03-14 RX ORDER — LANCETS
EACH MISCELLANEOUS
Qty: 100 EACH | Refills: 0 | Status: SHIPPED | OUTPATIENT
Start: 2023-03-14

## 2023-03-14 RX ADMIN — Medication 12 UNITS: at 08:55

## 2023-03-14 RX ADMIN — Medication 3 UNITS: at 07:17

## 2023-03-14 RX ADMIN — Medication 2 UNITS: at 17:14

## 2023-03-14 RX ADMIN — Medication 2 UNITS: at 11:22

## 2023-03-14 RX ADMIN — ENOXAPARIN SODIUM 40 MG: 100 INJECTION SUBCUTANEOUS at 07:17

## 2023-03-14 RX ADMIN — Medication 15 UNITS: at 17:14

## 2023-03-14 RX ADMIN — Medication 15 UNITS: at 11:22

## 2023-03-14 RX ADMIN — SODIUM CHLORIDE, PRESERVATIVE FREE 10 ML: 5 INJECTION INTRAVENOUS at 17:16

## 2023-03-14 RX ADMIN — SODIUM CHLORIDE, PRESERVATIVE FREE 10 ML: 5 INJECTION INTRAVENOUS at 06:00

## 2023-03-14 RX ADMIN — INSULIN GLARGINE 50 UNITS: 100 INJECTION, SOLUTION SUBCUTANEOUS at 22:26

## 2023-03-14 NOTE — PROGRESS NOTES
Provided pastoral care visit to [facility] patient. Did not include sacramental care.      Riri Mccarthy

## 2023-03-14 NOTE — PROGRESS NOTES
Problem: Falls - Risk of  Goal: *Absence of Falls  Description: Document Elder Gamez Fall Risk and appropriate interventions in the flowsheet.   Outcome: Progressing Towards Goal  Note: Fall Risk Interventions:

## 2023-03-14 NOTE — DIABETES MGMT
[address] [facility]  DIABETES MANAGEMENT CONSULT    Consulted by  Sam Chambers MD   for advanced nursing evaluation and care for inpatient blood glucose management. Evaluation and Action Plan   Inez Randolph is a 34year old gentleman, with Type 2 Diabetes on Lantus, Novolog and Metformin, who is admitted in DKA. This is his second admission for DKA (last ) and on previous discharge, he was given a one month supply of Lantus (1 vial) later obtained metformin and 1 vial of Novolog at a local free clinic. He has been using the same vial of Lantus and Novolog for the last three months and intermittently injecting as he had a limited supply if insulin syringes with needles. He did not know that each insulin vial is good for 28 days. He does not check glucose values to know if he experienced changes in glucose pattern over the last several days. Suspect that a major contributor to DKA has been intermittently injecting  insulin. DKA did resolve and he transitioned off the insulin gtt with moderate dose basal/bolus insulin. His BG is elevated but he did eat dinner and an am snack without insulin coverage. Management Rationale Action Plan      Medication   Basal needs Using 0.3 units/kg/D Lantus 50 units HS   Nutritional needs Using moderate sensitivity Humalog: 15 units Humalog with each consumed meal.     Hold if patient is NPO or consumes less than 50% of carbohydrates on meal tray   Corrective insulin Using resistant sensitivity    Additional orders  Consistent carbohydrate component of diet (60 grams CHO/meal)    Social work consult: Determine if his medicaid application is complete. Assistance with Hortencia Nichole with [employer]. Assistance to ensure he has diabetes supplies (insulin, insulin syringes with needles, glucometer kit) on d/c.        Diabetes Discharge Plan   Medication  Metformin needs to stop as liver enzymes are elevated    I called [facility] at [phone] and spoke with Deyanira Box. They are able to provide him with both long and short acting insulin moving forward at no cost at his follow-up apt on 3/28/23 at 1pm.  Per case mgt, they are able to provide a one month supply of all diabetes medications/supplies. These Rx have been sent to the outpatient pharmacy:  Lantus vial: 50 units SQ daily  Novolog vial: 15 units with meals  Insulin pen with needle (120)  4. Prescription for glucometer kit (Meter, Lancets (100), Strips (100)). Patient to obtain a blood glucose reading three times daily. First thing in the morning prior to eating and drinking anything then before lunch, dinner. Create a log and present to PCP for interpretation. Will fax labs to: [phone] attn: Medical clinic  Smoking cessation           Initial Presentation   Kacey Andrade is a 34 y.o. male who presented to the ED 3/13/23 with a few hr c/o abdominal pain, vomiting, diarrhea and cough. LAB: WBC 30.2, AG 20, , , AST 58, Alk Phos 125. Lipase 64  CXR: Dense airspace disease right upper lobe. Recommend follow-up to ensure  resolution     Chest CT:   1. Hepatic steatosis. 2. Multiple tiny pulmonary nodules are unchanged compared to November 2022. If  the patient is judged high risk for lung cancer (smoking history, history of  other malignancy, etc.) recommend follow-up CT in 12 months to assess for  stability. If the patient is judged low risk, no further follow-up is required. 3. Multiple levels of spinal canal narrowing as detailed above, most severe at  T8-9. HX:   Past Medical History:   Diagnosis Date    Depression     Diabetes ([facility]     Polysubstance Abuse  Anxiety    INITIAL DX:   DKA (diabetic ketoacidosis) ([facility] [E11.10]     Current Treatment     TX: IVF, Insulin gtt. Hospital Course   Clinical progress has been uncomplicated. 3/13: Admission   Diabetes History   Diagnosed with Type 2 Diabetes 6 years ago.   Was started on metformin and insulin therapy at diagnosis (doses and insulin type unknown) when in [place]. Took medications for about 6 wks. Was frustrated with elevated BG values and stopped taking medicine. Did not like his provider and stopped going for follow-up care. Hospital admission 11/22 with DKA in the setting of CAP- Discharged home on metformin and NPH (cash pay)    Diabetes-related Medical History  Acute complications  DKA  Neurological complications  Peripheral neuropathy  Other associated conditions     Depression    Diabetes Medication History  Key Antihyperglycemic Medications               metFORMIN (GLUCOPHAGE) 500 mg tablet (Taking) Take 1 Tablet by mouth two (2) times daily (with meals). insulin NPH (HUMULIN N) 100 unit/mL (3 mL) inpn (Taking) 20 Units by SubCUTAneous route Before breakfast and dinner. Diabetes self-management practices:   Eating pattern   [x] Breakfast  Taco bell crunch wrap or 2 eggs  [x] Lunch   May eat lunch 3-4 times a week. 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 [] Pneumonia:   There is no immunization history on file for this patient. [] Hepatitis:   There is no immunization history on file for this patient.   Social determinants of health impacting diabetes self-management practices   Struggling with anxiety and/or depression and Concerned that you need to know more about how to stay healthy with diabetes  Overall evaluation:    [x] Not achieving A1c target with drug therapy & self-care practices    Current Vape use  Hx cocaine use (last )  Was homeless [place], now back in  [place] living with mom last fall  Legally  and has a 11year old daughter living with him  Works full time at a [employer] in [place]  Regular marijuana use    Subjective   I want to go home.      Objective   Physical exam  General Morbidly Obese white male in no acute distress/ill-appearing. Conversant and cooperative  Neuro  Alert, oriented   Vital Signs Visit Vitals  BP (!) 147/95   Pulse 84   Temp 97.6 °F (36.4 °C)   Resp 18   Wt (!) 159 kg (350 lb 8 oz)   SpO2 94%   BMI 40.50 kg/m²     Skin  Warm and dry. Acanthosis noted along neckline. No lipohypertrophy or lipoatrophy noted at injection sites   Heart   Regular rate and rhythm.  No murmurs, rubs or gallops  Lungs  Clear to auscultation without rales or rhonchi  Extremities No foot wounds     Laboratory  Recent Labs     23  1620 23  1238 23  0824   * 303* 487*   AGAP 8 6 20*   WBC  --   --  30.2*   CREA 1.12 1.23 1.51*   AST  --   --  58*   ALT  --   --  108*     A1C 10.8% (down from 12% )    BMP:   Lab Results   Component Value Date/Time     2023 08:38 AM    K 4.2 2023 08:38 AM     2023 08:38 AM    CO2 25 2023 08:38 AM    AGAP 8 2023 08:38 AM     (H) 2023 08:38 AM    BUN 14 2023 08:38 AM    CREA 0.94 2023 08:38 AM        Factors impacting BG management  Factor Dose Comments   Nutrition:  Standard meals     Clear liquid diet    Other:   Kidney function  Liver function GFR >60    Elevated LFTs      Blood glucose pattern    Significant diabetes-related events over the past 24-72 hours  A1C 10.8% (down from 12% 22)  UDS + opiates   Last admission: Lantus 45 units SQ, Humalog 10 units with meals  Off insulin gtt  Basal: Lantus 50 units  Humalo units with meals  Correction: 5 units in the last 24h  Fasting 230  Bedtime 211  Ate dinner at 8pm  Had 6 crackers with PB at 5am    Assessment and Nursing Intervention   Nursing Diagnosis Risk for unstable blood glucose pattern   Nursing Intervention Domain 5250 Decision-making Support   Nursing Interventions Examined current inpatient diabetes/blood glucose control   Explored factors facilitating and impeding inpatient management  Explored corrective strategies with patient and responsible inpatient provider   Informed patient of rational for insulin strategy while hospitalized     Nursing Diagnosis 51440 Ineffective Health Management   Nursing Intervention Domain 5254 Decision-making Support   Nursing Interventions Identified diabetes self-management practices impeding diabetes control  Discussed diabetes survival skills related to  Healthy Plate eating plan; given handouts  Role of physical activity in improving insulin sensitivity and action  Procedure for blood glucose monitoring & options for low-cost products  Medications plan at discharge     Billing Code(s)   25970    Before making these care recommendations, I personally reviewed the hospitalization record, including notes, laboratory & diagnostic data and current medications, and examined the patient at the bedside (circumstances permitting) before determining care. More than fifty (50) percent of the time was spent in patient counseling and/or care coordination.   Total minutes: 60    SONDRA Blackmon  Diabetes Clinical Nurse Specialist  [facility]  Access via [employer]

## 2023-03-14 NOTE — PROGRESS NOTES
[address] [facility] Adult  Hospitalist Group                                                                                          Hospitalist Progress Note  Daya Herbert NP  Answering service: [phone] or [phone] from in house phone        Date of Service:  3/14/2023  NAME:  Verna East  :  1990  MRN:  [id number]       Admission Summary:   Per H&P, Verna East is a 34 y.o. male with history of morbid obesity, major depressive disorder insulin-dependent type 2 diabetes who presented to emergency room with complaints of abdominal pain, nausea and vomiting. Had been in his usual state of health until this morning when he woke up with GI symptoms. No sick contacts but has a 11year-old daughter who goes to . Admits to previous such episodes and admissions for HHS/DKA. The patient denies any fever, chills, chest pain, nausea, cough, congestion, recent illness, palpitations, or dysuria. Remarkable vitals on ER Presentation: h to 136, bp 150/122  Labs Remarkable for: glu 487, cr 1.5, alt 108, ast 58, ag 20  ER Images: ct abd et pelv: hepatic steatosis  ER Rx: glucostabilizer       Interval history / Subjective:   Prior to evaluating the patient, I reviewed previous provider notes, nursing notes, labs, VS and imaging    Saw the patient this morning on rounds. No complaints the moment of the encounter. Denies fever, chills, night sweats, pain or discomfort.   No diarrhea or abdominal symptoms     Assessment & Plan:         HHS / DM III  BS currently controlled  Continue basal bolus insulin regimen  A1c 10.8  Diabetes specialist following  I discussed the case with diabetes specialist, to set up with case management for Lantus and NovoLog, holding metformin  For follow-up in 2 weeks at [facility] clinic    Transaminitis  Did have mild elevation of liver enzymes, ALT, AST, alk phos elevated at 108, 58, 125 respectively  Improving, ALT down to 82, AST almost normal at 42 and alk phos normal at 83. MAYELA  Likely prerenal  Resolved    Leukocytosis  Did have elevation of WBC, 30.2 at presentation. The only previous CBCs I can see in the records, white count was lowest at 20.5 however he was recovering from pneumonia at that time  We will check another CBC today, possibly reactive does not seem to have any infectious process     MDD  -Continue home meds        Obesity  BMI 40  Counseled on healthy dietary choices           Code status: FULL  Prophylaxis: [address] discussed with: patient, nurse, CM  Anticipated Disposition: home likely tomorrow  Inpatient  Cardiac monitoring: None     Hospital Problems  Never Reviewed            Codes Class Noted POA    DKA (diabetic ketoacidosis) ([facility] [address] ICD-10-CM: E11.10  ICD-9-CM: 250.12  3/13/2023 Unknown           Social Determinants of Health     Tobacco Use: Unknown    Smoking Tobacco Use: Never    Smokeless Tobacco Use: Unknown    Passive Exposure: Not on file   Alcohol Use: Not on file   Financial Resource Strain: Not on file   Food Insecurity: Not on file   Transportation Needs: Not on file   Physical Activity: Not on file   Stress: Not on file   Social Connections: Not on file   Intimate Partner Violence: Not on file   Depression: Not on file   Housing Stability: Not on file       Review of Systems:   A comprehensive review of systems was negative except for that written in the HPI. Vital Signs:    Last 24hrs VS reviewed since prior progress note.  Most recent are:  Visit Vitals  BP (!) 147/95   Pulse 84   Temp 97.6 °F (36.4 °C)   Resp 18   Wt (!) 159 kg (350 lb 8 oz)   SpO2 94%   BMI 40.50 kg/m²         Intake/Output Summary (Last 24 hours) at 3/14/2023 0806  Last data filed at 3/13/2023 1630  Gross per 24 hour   Intake 457.92 ml   Output --   Net 457.92 ml        Physical Examination:     I had a face to face encounter with this patient and independently examined them on 3/14/2023 as outlined below:          General : alert x 3, awake, no acute distress,   HEENT: PEERL, EOMI, moist mucus membrane, TM clear  Neck: supple, no JVD, no meningeal signs  Chest: Clear to auscultation bilaterally   CVS: S1 S2 heard, Capillary refill less than 2 seconds  Abd: soft/ non tender, non distended, BS physiological,   Ext: no clubbing, no cyanosis, no edema, brisk 2+ DP pulses  Neuro/Psych: pleasant mood and affect, CN 2-12 grossly intact, sensory grossly within normal limit, Strength 5/5 in all extremities, DTR 1+ x 4  Skin: warm     Data Review:    Review and/or order of clinical lab test  Review and/or order of tests in the radiology section of [facility]      I have personally and independently reviewed all pertinent labs, diagnostic studies, imaging, and have provided independent interpretation of the same. Labs:     Recent Labs     03/13/23  0824   WBC 30.2*   HGB 18.2*   HCT 53.1*        Recent Labs     03/13/23  1620 03/13/23  1238 03/13/23  0824    135* 134*   K 3.8 4.4 4.3    106 97   CO2 23 23 17*   BUN 19 21* 21*   CREA 1.12 1.23 1.51*   * 303* 487*   CA 8.5 8.9 10.0   MG 1.7 1.8 1.7   PHOS  --   --  4.2     Recent Labs     03/13/23  0824   *   *   TBILI 0.5   TP 8.9*   ALB 4.6   GLOB 4.3*     No results for input(s): INR, PTP, APTT, INREXT in the last 72 hours. No results for input(s): FE, TIBC, PSAT, FERR in the last 72 hours. No results found for: FOL, RBCF   No results for input(s): PH, PCO2, PO2 in the last 72 hours. No results for input(s): CPK, CKNDX, TROIQ in the last 72 hours.     No lab exists for component: CPKMB  No results found for: CHOL, CHOLX, CHLST, CHOLV, HDL, HDLP, LDL, LDLC, DLDLP, TGLX, TRIGL, TRIGP, CHHD, CHHDX  Lab Results   Component Value Date/Time    Glucose (POC) 230 (H) 03/14/2023 06:37 AM    Glucose (POC) 211 (H) 03/13/2023 09:23 PM    Glucose (POC) 187 (H) 03/13/2023 07:37 PM    Glucose (POC) 147 (H) 03/13/2023 06:24 PM    Glucose (POC) 145 (H) 03/13/2023 05:11 PM     No results found for: COLOR, APPRN, SPGRU, REFSG, ANNABELLE, PROTU, GLUCU, KETU, BILU, UROU, BEBETO, LEUKU, GLUKE, EPSU, BACTU, WBCU, RBCU, CASTS, UCRY    Notes reviewed from all clinical/nonclinical/nursing services involved in patient's clinical care. Care coordination discussions were held with appropriate clinical/nonclinical/ nursing providers based on care coordination needs. Patients current active Medications were reviewed, considered, added and adjusted based on the clinical condition today. Home Medications were reconciled to the best of my ability given all available resources at the time of admission. Route is PO if not otherwise noted.       Admission Status:[id number]:::1}      Medications Reviewed:     Current Facility-Administered Medications   Medication Dose Route Frequency    dextrose 10 % infusion 0-250 mL  0-250 mL IntraVENous PRN    insulin lispro (HUMALOG) injection 12 Units  12 Units SubCUTAneous TID WITH MEALS    insulin lispro (HUMALOG) injection   SubCUTAneous AC&HS    sodium chloride (NS) flush 5-40 mL  5-40 mL IntraVENous Q8H    sodium chloride (NS) flush 5-40 mL  5-40 mL IntraVENous PRN    acetaminophen (TYLENOL) tablet 650 mg  650 mg Oral Q6H PRN    Or    acetaminophen (TYLENOL) suppository 650 mg  650 mg Rectal Q6H PRN    polyethylene glycol (MIRALAX) packet 17 g  17 g Oral DAILY PRN    ondansetron (ZOFRAN ODT) tablet 4 mg  4 mg Oral Q8H PRN    Or    ondansetron (ZOFRAN) injection 4 mg  4 mg IntraVENous Q6H PRN    enoxaparin (LOVENOX) injection 40 mg  40 mg SubCUTAneous Q12H    0.9% sodium chloride infusion  150 mL/hr IntraVENous CONTINUOUS    morphine injection 2 mg  2 mg IntraVENous Q4H PRN    Or    morphine injection 4 mg  4 mg IntraVENous Q4H PRN    glucose chewable tablet 16 g  4 Tablet Oral PRN    glucagon (GLUCAGEN) injection 1 mg  1 mg IntraMUSCular PRN    dextrose 10 % infusion 0-250 mL  0-250 mL IntraVENous PRN    insulin glargine (LANTUS) injection 50 Units  50 Units SubCUTAneous QHS     ______________________________________________________________________  EXPECTED LENGTH OF STAY: - - -  ACTUAL LENGTH OF STAY:          1                 Claus Man NP

## 2023-03-14 NOTE — PROGRESS NOTES
TRANSFER - OUT REPORT:    Verbal report given to 5S RN(name) on Priscilla Avitia being transferred to 5S (unit) for routine progression of care       Report consisted of patients Situation, Background, Assessment and   Recommendations(SBAR). Information from the following report(s) SBAR, Kardex, ED Summary, Intake/Output, Recent Results, and Cardiac Rhythm normal sinus  was reviewed with the receiving nurse. Lines:   Peripheral IV 03/13/23 Right Antecubital (Active)   Site Assessment Clean;Clean, dry, & intact 03/13/23 2000   Phlebitis Assessment 0 03/13/23 2000   Infiltration Assessment 0 03/13/23 2000   Dressing Status Clean, dry, & intact 03/13/23 2000   Dressing Type Transparent 03/13/23 2000   Hub Color/Line Status Pink;Flushed 03/13/23 2000   Action Taken Open ports on tubing capped 03/13/23 2000   Alcohol Cap Used Yes 03/13/23 2000        Opportunity for questions and clarification was provided.       Patient transported with:   Registered Nurse

## 2023-03-14 NOTE — PROGRESS NOTES
Occupational Therapy Screening:  Services are not indicated at this time. [facility] therapy based on results obtained during the nursing admission assessment. The patients chart was reviewed and the patient is not appropriate for a skilled therapy evaluation at this time. Please consult occupational therapy if any therapy needs arise. Thank you.     Rupert Ness OT

## 2023-03-15 VITALS
OXYGEN SATURATION: 94 % | WEIGHT: 315 LBS | RESPIRATION RATE: 16 BRPM | DIASTOLIC BLOOD PRESSURE: 88 MMHG | HEART RATE: 89 BPM | SYSTOLIC BLOOD PRESSURE: 133 MMHG | TEMPERATURE: 97.4 F | BODY MASS INDEX: 40.5 KG/M2

## 2023-03-15 LAB
BASOPHILS # BLD: 0.1 K/UL (ref 0–0.1)
BASOPHILS NFR BLD: 1 % (ref 0–1)
DIFFERENTIAL METHOD BLD: ABNORMAL
EOSINOPHIL # BLD: 0.3 K/UL (ref 0–0.4)
EOSINOPHIL NFR BLD: 3 % (ref 0–7)
ERYTHROCYTE [DISTWIDTH] IN BLOOD BY AUTOMATED COUNT: 12.5 % (ref 11.5–14.5)
GLUCOSE BLD STRIP.AUTO-MCNC: 169 MG/DL (ref 65–117)
GLUCOSE BLD STRIP.AUTO-MCNC: 220 MG/DL (ref 65–117)
HCT VFR BLD AUTO: 44.7 % (ref 36.6–50.3)
HGB BLD-MCNC: 15.1 G/DL (ref 12.1–17)
IMM GRANULOCYTES # BLD AUTO: 0 K/UL (ref 0–0.04)
IMM GRANULOCYTES NFR BLD AUTO: 0 % (ref 0–0.5)
LYMPHOCYTES # BLD: 3.9 K/UL (ref 0.8–3.5)
LYMPHOCYTES NFR BLD: 41 % (ref 12–49)
MCH RBC QN AUTO: 29.2 PG (ref 26–34)
MCHC RBC AUTO-ENTMCNC: 33.8 G/DL (ref 30–36.5)
MCV RBC AUTO: 86.3 FL (ref 80–99)
MONOCYTES # BLD: 0.8 K/UL (ref 0–1)
MONOCYTES NFR BLD: 9 % (ref 5–13)
NEUTS SEG # BLD: 4.4 K/UL (ref 1.8–8)
NEUTS SEG NFR BLD: 46 % (ref 32–75)
NRBC # BLD: 0 K/UL (ref 0–0.01)
NRBC BLD-RTO: 0 PER 100 WBC
PLATELET # BLD AUTO: 287 K/UL (ref 150–400)
PMV BLD AUTO: 9.9 FL (ref 8.9–12.9)
RBC # BLD AUTO: 5.18 M/UL (ref 4.1–5.7)
SERVICE CMNT-IMP: ABNORMAL
SERVICE CMNT-IMP: ABNORMAL
WBC # BLD AUTO: 9.5 K/UL (ref 4.1–11.1)

## 2023-03-15 PROCEDURE — 74011000250 HC RX REV CODE- 250: Performed by: STUDENT IN AN ORGANIZED HEALTH CARE EDUCATION/TRAINING PROGRAM

## 2023-03-15 PROCEDURE — 74011250636 HC RX REV CODE- 250/636: Performed by: STUDENT IN AN ORGANIZED HEALTH CARE EDUCATION/TRAINING PROGRAM

## 2023-03-15 PROCEDURE — 74011250637 HC RX REV CODE- 250/637: Performed by: NURSE PRACTITIONER

## 2023-03-15 PROCEDURE — 85025 COMPLETE CBC W/AUTO DIFF WBC: CPT

## 2023-03-15 PROCEDURE — 74011636637 HC RX REV CODE- 636/637: Performed by: CLINICAL NURSE SPECIALIST

## 2023-03-15 PROCEDURE — 36415 COLL VENOUS BLD VENIPUNCTURE: CPT

## 2023-03-15 PROCEDURE — 82962 GLUCOSE BLOOD TEST: CPT

## 2023-03-15 RX ORDER — INSULIN GLARGINE 100 [IU]/ML
60 INJECTION, SOLUTION SUBCUTANEOUS
Status: DISCONTINUED | OUTPATIENT
Start: 2023-03-15 | End: 2023-03-15 | Stop reason: HOSPADM

## 2023-03-15 RX ORDER — HYDROXYZINE 25 MG/1
25 TABLET, FILM COATED ORAL 2 TIMES DAILY
Status: DISCONTINUED | OUTPATIENT
Start: 2023-03-15 | End: 2023-03-15 | Stop reason: HOSPADM

## 2023-03-15 RX ORDER — INSULIN LISPRO 100 [IU]/ML
18 INJECTION, SOLUTION INTRAVENOUS; SUBCUTANEOUS
Status: DISCONTINUED | OUTPATIENT
Start: 2023-03-15 | End: 2023-03-15 | Stop reason: HOSPADM

## 2023-03-15 RX ORDER — BUPROPION HYDROCHLORIDE 75 MG/1
75 TABLET ORAL 2 TIMES DAILY
Status: DISCONTINUED | OUTPATIENT
Start: 2023-03-15 | End: 2023-03-15 | Stop reason: HOSPADM

## 2023-03-15 RX ADMIN — HYDROXYZINE HYDROCHLORIDE 25 MG: 25 TABLET, FILM COATED ORAL at 13:45

## 2023-03-15 RX ADMIN — SODIUM CHLORIDE, PRESERVATIVE FREE 10 ML: 5 INJECTION INTRAVENOUS at 06:00

## 2023-03-15 RX ADMIN — Medication 15 UNITS: at 06:36

## 2023-03-15 RX ADMIN — Medication 2 UNITS: at 07:30

## 2023-03-15 RX ADMIN — Medication 18 UNITS: at 12:47

## 2023-03-15 RX ADMIN — ENOXAPARIN SODIUM 40 MG: 100 INJECTION SUBCUTANEOUS at 06:28

## 2023-03-15 RX ADMIN — BUPROPION HYDROCHLORIDE 75 MG: 75 TABLET, FILM COATED ORAL at 13:45

## 2023-03-15 NOTE — PROGRESS NOTES
Transition of Care: Plan for discharge home today, Care management assisted in covering cost of insulin and supplies until patient can follow-up at [facility] on 3/28. Family will transport patient home. CM met with patient at bedside. Patient recently moved from [place]. Patient has out of state medicaid and needs assistance with obtaining insulin/supplies. Diabetes management RN assisted in securing patient with appointment at [facility] ([facility] clinic to assist with on-going insulin needs. Apt info on AVS.    CM spoke with CM supervisor and obtained permission for care management to assist in cost of meds. Meds obtained from pharmacy and handed to RN who delivered to patient at bedside.      ROSE Galloway/CRM

## 2023-03-15 NOTE — PROGRESS NOTES
Spiritual Care Assessment/Progress Note  [address]      NAME: May Walker      MRN: [id number]  AGE: 34 y.o.  SEX: male  Adventist Affiliation: Holiness   Language: English     3/15/2023     Total Time (in minutes): 5     Spiritual Assessment begun in [facility] through conversation with:         [x]Patient        [] Family    [] Friend(s)        Reason for Consult: [facility]     Spiritual beliefs: (Please include comment if needed)     [x] Identifies with a teressa tradition:  Holiness       [] Supported by a teressa community:            [] Claims no spiritual orientation:           [] Seeking spiritual identity:                [] Adheres to an individual form of spirituality:           [] Not able to assess:                           Identified resources for coping:      [x] Prayer                               [x] Music                  [] Guided Imagery     [x] Family/friends                 [] Pet visits     [] Devotional reading                         [] Unknown     [] Other:                                               Interventions offered during this visit: (See comments for more details)    Patient Interventions: Affirmation of teressa, Communion (Holiness), Initial/Spiritual assessment, patient floor, Prayer (actual), Prayer (assurance of)           Plan of Care:     [x] Support spiritual and/or cultural needs    [] Support AMD and/or advance care planning process      [] Support grieving process   [] Coordinate Rites and/or Rituals    [] Coordination with community clergy   [] No spiritual needs identified at this time   [] Detailed Plan of Care below (See Comments)  [] Make referral to Music Therapy  [] Make referral to Pet Therapy     [] Make referral to Addiction services  [] Make referral to [facility]  [] Make referral to Spiritual Care Partner  [] No future visits requested        [] Contact Spiritual Care for further referrals     Comments: Mr. Moore Neelam was dressed and waiting to go home when his mother gets off work. He declined communion today. NO further spiritual needs at this time, Assured of prayer for his well-being.     Eartha Kehr, SBS, RN, ACSW, LCSW   Page:  [phone]

## 2023-03-15 NOTE — PROGRESS NOTES
PATIENT ID: Charly Yeboah  DATE OF ADMISSION: 3/13/2023  8:08 AM    DATE OF DISCHARGE: 3/15/2023        ATTENDING PHYSICIAN: Chaparrita Sweeney NP  DISCHARGING PROVIDER: Em Maldonado NP    To contact this individual call [phone] and ask the  to page. If unavailable ask to be transferred the Adult Hospitalist Department. To whom it may concern  Mr. Michael Pak has been under my care at [facility] from 3/13/2023 until 3/15/2023. Please excuse him for his absence during this time.   If any questions, please do not hesitate to reach out to the hospitalist department    Sincerely      Em Maldonado DNP

## 2023-03-15 NOTE — DISCHARGE INSTRUCTIONS
Discharge Instructions       PATIENT ID: Guadalupe Lopez  MRN: [id number]   YOB: 1990    DATE OF ADMISSION: 3/13/2023  8:08 AM    DATE OF DISCHARGE: 3/15/2023   PRIMARY CARE PROVIDER: None     ATTENDING PHYSICIAN: Holley Bender MD  DISCHARGING PROVIDER: Vito Nagel NP    To contact this individual call [phone] and ask the  to page. If unavailable ask to be transferred the Adult Hospitalist Department. DISCHARGE DIAGNOSES [facility]    CONSULTATIONS:  Diabetes CNS    PROCEDURES/SURGERIES: * No surgery found *    PENDING TEST RESULTS:   At the time of discharge the following test results are still pending:     FOLLOW UP APPOINTMENTS:   [address] in 2 weeks  Endocrine/ diabetes clinic tomorrow    ADDITIONAL CARE RECOMMENDATIONS:     DIET: Diabetic Diet      ACTIVITY: Activity as tolerated    WOUND CARE: na    EQUIPMENT needed: na      DISCHARGE MEDICATIONS:   See Medication Reconciliation Form    It is important that you take the medication exactly as they are prescribed. Keep your medication in the bottles provided by the pharmacist and keep a list of the medication names, dosages, and times to be taken in your wallet. Do not take other medications without consulting your doctor. NOTIFY YOUR PHYSICIAN FOR ANY OF THE FOLLOWING:   Fever over 101 degrees for 24 hours. Chest pain, shortness of breath, fever, chills, nausea, vomiting, diarrhea, change in mentation, falling, weakness, bleeding. Severe pain or pain not relieved by medications. Or, any other signs or symptoms that you may have questions about.       DISPOSITION:  x  Home With:   OT  PT  formerly [facility]  RN       SNF/Inpatient Rehab/LTAC    Independent/assisted living    Hospice    Other:           Signed:   Vito Nagel NP  3/15/2023  12:51 PM

## 2023-03-15 NOTE — DIABETES MGMT
[facility]  DIABETES MANAGEMENT CONSULT    Consulted by  Codi Ding MD   for advanced nursing evaluation and care for inpatient blood glucose management. Evaluation and Action Plan   Tana Onofre is a 34year old gentleman, with Type 2 Diabetes on Lantus, Novolog and Metformin, who is admitted in DKA. This is his second admission for DKA (last ) and on previous discharge, he was given a one month supply of Lantus (1 vial) later obtained metformin and 1 vial of Novolog at a local free clinic. He has been using the same vial of Lantus and Novolog for the last three months and intermittently injecting as he had a limited supply if insulin syringes with needles. He did not know that each insulin vial is good for 28 days. He does not check glucose values to know if he experienced changes in glucose pattern over the last several days. Suspect that a major contributor to DKA has been intermittently injecting  insulin. DKA did resolve and he transitioned off the insulin gtt with moderate dose basal/bolus insulin. His BG is elevated but he did eat dinner and an am snack without insulin coverage. Management Rationale Action Plan      Medication   Basal needs Using 0.35 units/kg/D Lantus 60 units HS as fasting BG over goal   Nutritional needs Using resistant sensitivity Humalo units Humalog with each consumed meal.     Hold if patient is NPO or consumes less than 50% of carbohydrates on meal tray   Corrective insulin Using high sensitivity HIGH sensitivity ACHS   Additional orders  Consistent carbohydrate component of diet (60 grams CHO/meal)    Social work consult: Assistance with Hortencia Nichole with [employer]. Assistance to ensure he has diabetes supplies (insulin, insulin syringes with needles, glucometer kit) on d/c.        Diabetes Discharge Plan   Medication  Metformin needs to stop as liver enzymes are elevated    I called [address] [facility] at [phone] and spoke with Rakesh Lees. They are able to provide him with both long and short acting insulin moving forward at no cost at his follow-up apt on 3/28/23 at 1pm.  Does have a FUV tomorrow 3/16 at 9am with the diabetes team at [facility]. Per case mgt, they are able to provide a one month supply of all diabetes medications/supplies at discharge. These Rx have been sent to the outpatient pharmacy:  Lantus vial: 50 units SQ daily  Novolog vial: 15 units with meals  Insulin pen with needle (120)  4. Prescription for glucometer kit (Meter, Lancets (100), Strips (100)). Patient to obtain a blood glucose reading three times daily. First thing in the morning prior to eating and drinking anything then before lunch, dinner. Create a log and present to PCP for interpretation. Have faxed labs to: [phone] attn: Medical clinic  Smoking cessation           Initial Presentation   Chrystal Gamez is a 34 y.o. male who presented to the ED 3/13/23 with a few hr c/o abdominal pain, vomiting, diarrhea and cough. LAB: WBC 30.2, AG 20, , , AST 58, Alk Phos 125. Lipase 64  CXR: Dense airspace disease right upper lobe. Recommend follow-up to ensure  resolution     Chest CT:   1. Hepatic steatosis. 2. Multiple tiny pulmonary nodules are unchanged compared to November 2022. If  the patient is judged high risk for lung cancer (smoking history, history of  other malignancy, etc.) recommend follow-up CT in 12 months to assess for  stability. If the patient is judged low risk, no further follow-up is required. 3. Multiple levels of spinal canal narrowing as detailed above, most severe at  T8-9. HX:   Past Medical History:   Diagnosis Date    Depression     Diabetes ([address]     Polysubstance Abuse  Anxiety    INITIAL DX:   DKA (diabetic ketoacidosis) ([address] [E11.10]     Current Treatment     TX: IVF, Insulin gtt. Hospital Course   Clinical progress has been uncomplicated.    3/13: Admission Diabetes History   Diagnosed with Type 2 Diabetes 6 years ago. Was started on metformin and insulin therapy at diagnosis (doses and insulin type unknown) when in [place]. Took medications for about 6 wks. Was frustrated with elevated BG values and stopped taking medicine. Did not like his provider and stopped going for follow-up care. Hospital admission 11/22 with DKA in the setting of CAP- Discharged home on metformin and NPH (cash pay)    Diabetes-related Medical History  Acute complications  DKA  Neurological complications  Peripheral neuropathy  Other associated conditions     Depression    Diabetes Medication History  Key Antihyperglycemic Medications               insulin glargine (LANTUS) 100 unit/mL injection (Taking) Please inject 50 units SQ once daily  Indications: type 2 diabetes mellitus    insulin aspart U-100 (NovoLOG U-100 Insulin aspart) 100 unit/mL injection (Taking) Please inject 15 units SQ three times daily with meals: at Breakfast, Lunch and Dinner    metFORMIN (GLUCOPHAGE) 500 mg tablet (Taking) Take 1 Tablet by mouth two (2) times daily (with meals). insulin NPH (HUMULIN N) 100 unit/mL (3 mL) inpn (Taking) 20 Units by SubCUTAneous route Before breakfast and dinner. Diabetes self-management practices:   Eating pattern   [x] Breakfast  [employer] crunch wrap or 2 eggs  [x] Lunch   May eat lunch 3-4 times a week. 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   [] Pneumonia:   There is no immunization history on file for this patient. [] Hepatitis:   There is no immunization history on file for this patient. Social determinants of health impacting diabetes self-management practices   Struggling with anxiety and/or depression and Concerned that you need to know more about how to stay healthy with diabetes  Overall evaluation:    [x] Not achieving A1c target with drug therapy & self-care practices    Current Vape use  Hx cocaine use (last 6/22)  Was homeless in [place], now back in [place] living with mom last fall  Legally  and has a 11year old daughter living with him  Works full time at a [employer] in [place]  Regular marijuana use    Subjective   I want to go home.      Objective   Physical exam  General Morbidly Obese white male in no acute distress/ill-appearing. Conversant and cooperative  Neuro  Alert, oriented   Vital Signs Visit Vitals  /88   Pulse 89   Temp 97.4 °F (36.3 °C)   Resp 16   Wt (!) 159 kg (350 lb 8 oz)   SpO2 94%   BMI 40.50 kg/m²     Skin  Warm and dry. Acanthosis noted along neckline. No lipohypertrophy or lipoatrophy noted at injection sites   Heart   Regular rate and rhythm.  No murmurs, rubs or gallops  Lungs  Clear to auscultation without rales or rhonchi  Extremities No foot wounds     Laboratory  Recent Labs     03/15/23  0002 03/14/23  1101 03/14/23  0838 03/13/23  1620 03/13/23  1238 03/13/23  0824   GLU  --   --  299* 177* 303* 487*   AGAP  --   --  8 8 6 20*   WBC 9.5 9.8  --   --   --  30.2*   CREA  --   --  0.94 1.12 1.23 1.51*   AST  --   --  42*  --   --  58*   ALT  --   --  82*  --   --  108*     A1C 10.8% (down from 12% 11/22)    BMP:   No results found for: NA, K, CL, CO2, AGAP, GLU, BUN, CREA, GFRAA, GFRNA       Factors impacting BG management  Factor Dose Comments   Nutrition:  Standard meals     Clear liquid diet    Other:   Kidney function  Liver function GFR >60    Elevated LFTs      Blood glucose pattern    Significant diabetes-related events over the past 24-72 hours  A1C 10.8% (down from 12% 22)  UDS + opiates   Last admission: Lantus 45 units SQ, Humalog 10 units with meals  Off insulin gtt  Basal: Lantus 50 units  Humalog: 15 units with meals  Correction: 6 units in the last 24h  Fasting 220  Bedtime 211  Pre-prandial B-209    Assessment and Nursing Intervention   Nursing Diagnosis Risk for unstable blood glucose pattern   Nursing Intervention Domain 5250 Decision-making Support   Nursing Interventions Examined current inpatient diabetes/blood glucose control   Explored factors facilitating and impeding inpatient management  Explored corrective strategies with patient and responsible inpatient provider   Informed patient of rational for insulin strategy while hospitalized     Nursing Diagnosis 10143 Ineffective Health Management   Nursing Intervention Domain 5250 Decision-making Support   Nursing Interventions Identified diabetes self-management practices impeding diabetes control  Discussed diabetes survival skills related to  Healthy Plate eating plan; given handouts  Role of physical activity in improving insulin sensitivity and action  Procedure for blood glucose monitoring & options for low-cost products  Medications plan at discharge     Billing Code(s)   No charge    Before making these care recommendations, I personally reviewed the hospitalization record, including notes, laboratory & diagnostic data and current medications, and examined the patient at the bedside (circumstances permitting) before determining care. More than fifty (50) percent of the time was spent in patient counseling and/or care coordination.   Total minutes: 13    SONDRA Tim  Diabetes Clinical Nurse Specialist  [facility]  Access via [employer]

## 2023-03-15 NOTE — PROGRESS NOTES
Verbal shift change report given to ARSH Hunter (oncoming nurse) by Pastora Mei RN (offgoing nurse). Report included the following information SBAR.

## 2023-03-15 NOTE — DISCHARGE SUMMARY
Discharge Summary       PATIENT ID: Loida Sheets  MRN: [id number]   YOB: 1990    DATE OF ADMISSION: 3/13/2023  8:08 AM    DATE OF DISCHARGE: 3/15/2023   PRIMARY CARE PROVIDER: None     ATTENDING PHYSICIAN: Nichelle Ralph MD  DISCHARGING PROVIDER: Raimundo Porras NP    To contact this individual call [phone] and ask the  to page. If unavailable ask to be transferred the Adult Hospitalist Department. CONSULTATIONS: None    PROCEDURES/SURGERIES: * No surgery found *    ADMITTING DIAGNOSES & HOSPITAL COURSE:   Per H&P, Loida Sheets is a 34 y.o. male with history of morbid obesity, major depressive disorder insulin-dependent type 2 diabetes who presented to emergency room with complaints of abdominal pain, nausea and vomiting. Had been in his usual state of health until this morning when he woke up with GI symptoms. No sick contacts but has a 11year-old daughter who goes to . Admits to previous such episodes and admissions for HHS/DKA. The patient denies any fever, chills, chest pain, nausea, cough, congestion, recent illness, palpitations, or dysuria. Remarkable vitals on ER Presentation: h to 136, bp 150/122  Labs Remarkable for: glu 487, cr 1.5, alt 108, ast 58, ag 20  ER Images: ct abd et pelv: hepatic steatosis  ER Rx: glucostabilizer        DISCHARGE DIAGNOSES / PLAN:       HHS / DM III  BS currently controlled  Continue basal bolus insulin regimen  A1c 10.8  Diabetes specialist following  I discussed the case with diabetes specialist, to set up with case management for Lantus and NovoLog, holding metformin  For follow-[facility] in 2 weeks [facility]  Follow up with endo tomorrow     Transaminitis  Did have mild elevation of liver enzymes, ALT, AST, alk phos elevated at 108, 58, 125 respectively  Improving, ALT down to 82, AST almost normal at 42 and alk phos normal at 83.      MAYELA  Likely prerenal  Resolved     Leukocytosis  Did have elevation of WBC, 30.2 at presentation. The only previous CBCs I can see in the records, white count was lowest at 20.5 however he was recovering from pneumonia at that time  We will check another CBC today, possibly reactive does not seem to have any infectious process     MDD  Continue home meds        Obesity  BMI 40  Counseled on healthy dietary choices                PENDING TEST RESULTS:   At the time of discharge the following test results are still pending:     FOLLOW UP APPOINTMENTS:    Follow-up Information       Follow up With Specialties Details Why Contact Info    [address]  Follow up on 3/28/2023 Follow-up appointment 3/28 at 1pm.  Please bring your blood sugar machine with you to this appointment. Pat   [phone]    None    None (140) Patient stated that they have no PCP               ADDITIONAL CARE RECOMMENDATIONS:     DIET: Diabetic Diet      ACTIVITY: Activity as tolerated    WOUND CARE: na    EQUIPMENT needed: na      DISCHARGE MEDICATIONS:  Current Discharge Medication List        START taking these medications    Details   insulin glargine (LANTUS) 100 unit/mL injection Please inject 50 units SQ once daily  Indications: type 2 diabetes mellitus  Qty: 1 mL, Refills: 0  Start date: 3/14/2023      insulin aspart U-100 (NovoLOG U-100 Insulin aspart) 100 unit/mL injection Please inject 15 units SQ three times daily with meals: at Breakfast, Lunch and Dinner  Qty: 10 mL, Refills: 0  Start date: 3/14/2023      Insulin Syringe-Needle U-100 1 mL 31 gauge x 5/16 syrg 1 Each by Does Not Apply route Before breakfast, lunch, dinner and at bedtime. Qty: 120 Each, Refills: 0  Start date: 3/14/2023      glucose blood VI test strips (blood glucose test) strip Test glucose 3 times daily: before breakfast, lunch and dinner. Qty: 100 Strip, Refills: 0  Start date: 3/14/2023      lancets misc Test glucose 3 times daily, before breakfast, lunch and dinner. E11.65 Type 2 Diabetes with hyperglycemia  Qty: 100 Each, Refills: 0  Start date: 3/14/2023           CONTINUE these medications which have CHANGED    Details   !! Blood-Glucose Meter monitoring kit Prescription for glucometer kit (Meter, Lancets (100), Strips (100)). Patient to obtain a blood glucose reading three times daily. First thing in the morning prior to eating and drinking anything then before lunch and dinner  Create a log and present to PCP for interpretation. E11.65 type 2 Diabetes with hyperglycemia  Qty: 1 Kit, Refills: 0  Start date: 3/14/2023      !! Blood-Glucose Meter monitoring kit Check sugar 3 times daily, before breakfast, lunch and dinner  Qty: 1 Kit, Refills: 0  Start date: 3/14/2023       !! - Potential duplicate medications found. Please discuss with provider. CONTINUE these medications which have NOT CHANGED    Details   buPROPion (WELLBUTRIN) 75 mg tablet Take 75 mg by mouth two (2) times a day.      hydrOXYzine HCL (ATARAX) 50 mg tablet Take 25 mg by mouth two (2) times a day. Indications: anxious           STOP taking these medications       metFORMIN (GLUCOPHAGE) 500 mg tablet Comments:   Reason for Stopping:         insulin NPH (HUMULIN N) 100 unit/mL (3 mL) inpn Comments:   Reason for Stopping:                 NOTIFY YOUR PHYSICIAN FOR ANY OF THE FOLLOWING:   Fever over 101 degrees for 24 hours. Chest pain, shortness of breath, fever, chills, nausea, vomiting, diarrhea, change in mentation, falling, weakness, bleeding. Severe pain or pain not relieved by medications. Or, any other signs or symptoms that you may have questions about.     DISPOSITION:   x Home With:   OT  PT  HH  RN       Long term SNF/Inpatient Rehab    Independent/assisted living    Hospice    Other:       PATIENT CONDITION AT DISCHARGE:     Functional status    Poor     Deconditioned    x Independent      Cognition   x  Lucid     Forgetful     Dementia      Catheters/lines (plus indication)    Basilio PICC     PEG    x None      Code status    x Full code     DNR      PHYSICAL EXAMINATION AT DISCHARGE:    General : alert x 3, awake, no acute distress,   HEENT: PEERL, EOMI, moist mucus membrane,   Neck: supple, no JVD,   Chest: Clear to auscultation bilaterally   CVS: S1 S2 heard, Capillary refill less than 2 seconds  Abd: soft/ Non tender, non distended, BS physiological,   Ext: no clubbing, no cyanosis, no edema, brisk 2+ DP pulses  Neuro/Psych: pleasant mood and affect, sensory grossly within normal limit, Strength 5/5 in all extremities,   Skin: warm     CHRONIC MEDICAL DIAGNOSES:  Problem List as of 3/15/2023 Never Reviewed            Codes Class Noted - Resolved    DKA (diabetic ketoacidosis) ([facility] ICD-10-CM: E11.10  ICD-9-CM: 250.12  3/13/2023 - Present        CAP (community acquired pneumonia) ICD-10-CM: J18.9  ICD-9-CM: 583  11/27/2022 - Present           Greater than 31 minutes were spent with the patient on counseling and coordination of care    Signed:   Em Maldonado NP  3/15/2023  12:48 PM

## 2023-03-16 ENCOUNTER — HOSPITAL ENCOUNTER (OUTPATIENT)
Dept: LAB | Age: 33
Discharge: HOME | End: 2023-03-16

## 2023-03-16 LAB
ALBUMIN SERPL-MCNC: 3.7 G/DL (ref 3.5–5)
ALBUMIN/GLOB SERPL: 1 (ref 1.1–2.2)
ALP SERPL-CCNC: 85 U/L (ref 45–117)
ALT SERPL-CCNC: 113 U/L (ref 12–78)
ANION GAP SERPL CALC-SCNC: 3 MMOL/L (ref 5–15)
APPEARANCE UR: CLEAR
AST SERPL-CCNC: 57 U/L (ref 15–37)
BACTERIA URNS QL MICRO: NEGATIVE /HPF
BILIRUB SERPL-MCNC: 0.3 MG/DL (ref 0.2–1)
BILIRUB UR QL: NEGATIVE
BUN SERPL-MCNC: 15 MG/DL (ref 6–20)
BUN/CREAT SERPL: 18 (ref 12–20)
CALCIUM SERPL-MCNC: 8.9 MG/DL (ref 8.5–10.1)
CHLORIDE SERPL-SCNC: 106 MMOL/L (ref 97–108)
CHOLEST SERPL-MCNC: 148 MG/DL
CO2 SERPL-SCNC: 28 MMOL/L (ref 21–32)
COLOR UR: ABNORMAL
CREAT SERPL-MCNC: 0.82 MG/DL (ref 0.7–1.3)
CREAT UR-MCNC: 117 MG/DL
EPITH CASTS URNS QL MICRO: ABNORMAL /LPF
GLOBULIN SER CALC-MCNC: 3.7 G/DL (ref 2–4)
GLUCOSE SERPL-MCNC: 217 MG/DL (ref 65–100)
GLUCOSE UR STRIP.AUTO-MCNC: 250 MG/DL
HAV IGM SER QL: NONREACTIVE
HBV CORE IGM SER QL: NONREACTIVE
HBV SURFACE AG SER QL: <0.1 INDEX
HBV SURFACE AG SER QL: NEGATIVE
HCV AB SERPL QL IA: NONREACTIVE
HDLC SERPL-MCNC: 42 MG/DL
HDLC SERPL: 3.5 (ref 0–5)
HGB UR QL STRIP: NEGATIVE
HIV 1+2 AB+HIV1 P24 AG SERPL QL IA: NONREACTIVE
HIV12 RESULT COMMENT, HHIVC: NORMAL
HYALINE CASTS URNS QL MICRO: ABNORMAL /LPF (ref 0–5)
KETONES UR QL STRIP.AUTO: NEGATIVE MG/DL
LDLC SERPL CALC-MCNC: 85.2 MG/DL (ref 0–100)
LEUKOCYTE ESTERASE UR QL STRIP.AUTO: NEGATIVE
MICROALBUMIN UR-MCNC: 4.51 MG/DL
MICROALBUMIN/CREAT UR-RTO: 39 MG/G (ref 0–30)
NITRITE UR QL STRIP.AUTO: NEGATIVE
PH UR STRIP: 5.5 (ref 5–8)
POTASSIUM SERPL-SCNC: 4.5 MMOL/L (ref 3.5–5.1)
PROT SERPL-MCNC: 7.4 G/DL (ref 6.4–8.2)
PROT UR STRIP-MCNC: NEGATIVE MG/DL
RBC #/AREA URNS HPF: ABNORMAL /HPF (ref 0–5)
SODIUM SERPL-SCNC: 137 MMOL/L (ref 136–145)
SP GR UR REFRACTOMETRY: 1.02 (ref 1–1.03)
SP1: NORMAL
SP2: NORMAL
SP3: NORMAL
TRIGL SERPL-MCNC: 104 MG/DL (ref ?–150)
TSH SERPL DL<=0.05 MIU/L-ACNC: 1.33 UIU/ML (ref 0.36–3.74)
UROBILINOGEN UR QL STRIP.AUTO: 0.2 EU/DL (ref 0.2–1)
VLDLC SERPL CALC-MCNC: 20.8 MG/DL
WBC URNS QL MICRO: ABNORMAL /HPF (ref 0–4)

## 2023-03-16 PROCEDURE — 81003 URINALYSIS AUTO W/O SCOPE: CPT

## 2023-03-16 PROCEDURE — 84443 ASSAY THYROID STIM HORMONE: CPT

## 2023-03-16 PROCEDURE — 82043 UR ALBUMIN QUANTITATIVE: CPT

## 2023-03-16 PROCEDURE — 87389 HIV-1 AG W/HIV-1&-2 AB AG IA: CPT

## 2023-03-16 PROCEDURE — 36415 COLL VENOUS BLD VENIPUNCTURE: CPT

## 2023-03-16 PROCEDURE — 80053 COMPREHEN METABOLIC PANEL: CPT

## 2023-03-16 PROCEDURE — 80061 LIPID PANEL: CPT

## 2023-03-16 PROCEDURE — 80074 ACUTE HEPATITIS PANEL: CPT

## 2023-03-16 PROCEDURE — 82306 VITAMIN D 25 HYDROXY: CPT

## 2023-03-20 LAB — 25(OH)D3 SERPL-MCNC: 17 NG/ML (ref 30–100)

## 2023-03-22 ENCOUNTER — HOSPITAL ENCOUNTER (OUTPATIENT)
Dept: LAB | Age: 33
Discharge: HOME | End: 2023-03-22

## 2023-03-22 PROCEDURE — 83036 HEMOGLOBIN GLYCOSYLATED A1C: CPT

## 2023-03-22 PROCEDURE — 85025 COMPLETE CBC W/AUTO DIFF WBC: CPT

## 2023-03-22 PROCEDURE — 36415 COLL VENOUS BLD VENIPUNCTURE: CPT

## 2023-03-23 LAB
BASOPHILS # BLD: 0.1 K/UL (ref 0–0.1)
BASOPHILS NFR BLD: 1 % (ref 0–1)
COMMENT, HOLDF: NORMAL
DIFFERENTIAL METHOD BLD: NORMAL
EOSINOPHIL # BLD: 0.3 K/UL (ref 0–0.4)
EOSINOPHIL NFR BLD: 3 % (ref 0–7)
ERYTHROCYTE [DISTWIDTH] IN BLOOD BY AUTOMATED COUNT: 12.5 % (ref 11.5–14.5)
EST. AVERAGE GLUCOSE BLD GHB EST-MCNC: 258 MG/DL
HBA1C MFR BLD: 10.6 % (ref 4–5.6)
HCT VFR BLD AUTO: 48.7 % (ref 36.6–50.3)
HGB BLD-MCNC: 16 G/DL (ref 12.1–17)
IMM GRANULOCYTES # BLD AUTO: 0 K/UL (ref 0–0.04)
IMM GRANULOCYTES NFR BLD AUTO: 0 % (ref 0–0.5)
LYMPHOCYTES # BLD: 2.9 K/UL (ref 0.8–3.5)
LYMPHOCYTES NFR BLD: 33 % (ref 12–49)
MCH RBC QN AUTO: 29.1 PG (ref 26–34)
MCHC RBC AUTO-ENTMCNC: 32.9 G/DL (ref 30–36.5)
MCV RBC AUTO: 88.5 FL (ref 80–99)
MONOCYTES # BLD: 0.8 K/UL (ref 0–1)
MONOCYTES NFR BLD: 9 % (ref 5–13)
NEUTS SEG # BLD: 4.9 K/UL (ref 1.8–8)
NEUTS SEG NFR BLD: 54 % (ref 32–75)
NRBC # BLD: 0 K/UL (ref 0–0.01)
NRBC BLD-RTO: 0 PER 100 WBC
PLATELET # BLD AUTO: 361 K/UL (ref 150–400)
PMV BLD AUTO: 10.7 FL (ref 8.9–12.9)
RBC # BLD AUTO: 5.5 M/UL (ref 4.1–5.7)
SAMPLES BEING HELD,HOLD: NORMAL
WBC # BLD AUTO: 9 K/UL (ref 4.1–11.1)

## 2024-05-06 NOTE — DISCHARGE SUMMARY
05/06/24                            Behzad Licea   [place] [facility] [address]    To Whom It May Concern:    This is to certify Behzad Fisheran was evaluated with Kristina Jerry MD on 05/06/24 and can return to regular work on 5/7/24.     RESTRICTIONS: none            Electronically signed by:  Kristina Jerry MD  [facility]  [address] [facility] [address]  Dept Phone: [phone]        Discharge Summary       PATIENT ID: Shay Major  MRN: [id number]   YOB: 1990    DATE OF ADMISSION: 11/27/2022  1:49 PM    DATE OF DISCHARGE: 11/29/2022   PRIMARY CARE PROVIDER: None     ATTENDING PHYSICIAN: Dr Maricruz Florence  DISCHARGING PROVIDER: Maricruz Florence MD    To contact this individual call [phone] and ask the  to page. If unavailable ask to be transferred the Adult Hospitalist Department. CONSULTATIONS: None    PROCEDURES/SURGERIES: * No surgery found *    DISCHARGE DIAGNOSES:   DKA/DM type 2  -Diagnosis on DM type 2 is not new for him (he was diagnosed 2 years ago but stopped taking insulin sec to his job)  -now off insulin gtt  -Lantus increased today/SSI  -Appreciate discussion with DTC  -Will discharge the patient on NPH because of no insurance. Would not keep the regimen complicated as we have already seen non compliance     Hypokalemia  Hypophosphatemia  Hyponatremia  -replace as needed    Community-acquired pneumonia  Sinusitis  Rhinovirus positive  -Right upper lobe pneumonia as visualized on CT  -Continue Ceftriaxone/Azithromycin    Depression/anxiety: Continuing bupropion and hydroxyzine. stable  Obesity: counseled on diet and exercise      ADDITIONAL CARE RECOMMENDATIONS:   Follow up with PMD  Blood sugar checks atleast daily     NOTIFY YOUR PHYSICIAN FOR ANY OF THE FOLLOWING:   Fever over 101 degrees for 24 hours. Chest pain, shortness of breath, fever, chills, nausea, vomiting, diarrhea, change in mentation, falling, weakness, bleeding. Severe pain or pain not relieved by medications, as well as any other signs or symptoms that you may have questions about.     FOLLOW UP APPOINTMENTS:    Follow-up Information       Follow up With Specialties Details Why Contact Info    None    None (395) Patient stated that they have no PCP      PMD  Follow up in 1 week(s)                 DIET: Diabetic Diet    ACTIVITY: Activity as tolerated    DISCHARGE MEDICATIONS:  Current Discharge Medication List        START taking these medications    Details   azithromycin (ZITHROMAX) 250 mg tablet 1 tab PO daily  Qty: 6 Tablet, Refills: 0  Start date: 11/29/2022, End date: 12/4/2022      cefUROXime (CEFTIN) 500 mg tablet Take 1 Tablet by mouth two (2) times a day. Qty: 10 Tablet, Refills: 0  Start date: 11/29/2022      phosphorus (K PHOS NEUTRAL) 250 mg tablet Take 1 Tablet by mouth two (2) times a day. Qty: 6 Tablet, Refills: 0  Start date: 11/29/2022      Blood-Glucose Meter monitoring kit daily  Qty: 1 Kit, Refills: 0  Start date: 11/29/2022      metFORMIN (GLUCOPHAGE) 500 mg tablet Take 1 Tablet by mouth two (2) times daily (with meals). Qty: 60 Tablet, Refills: 2  Start date: 11/29/2022      insulin NPH (HUMULIN N) 100 unit/mL (3 mL) inpn 20 Units by SubCUTAneous route Before breakfast and dinner. Qty: 1 Pen, Refills: 2  Start date: 11/29/2022           CONTINUE these medications which have NOT CHANGED    Details   buPROPion (WELLBUTRIN) 75 mg tablet Take 75 mg by mouth two (2) times a day.      hydrOXYzine HCL (ATARAX) 50 mg tablet Take 25 mg by mouth two (2) times a day.  Indications: anxious             DISPOSITION:   x Home With:   OT  PT  HH  RN       Long term SNF/Inpatient Rehab    Independent/assisted living    Hospice    Other:       PATIENT CONDITION AT DISCHARGE:     Functional status    Poor     Deconditioned    x Independent      Cognition    x Lucid     Forgetful     Dementia      Catheters/lines (plus indication)    [place]     PICC     PEG    x None      Code status    x Full code     DNR      PHYSICAL EXAMINATION AT DISCHARGE:  Please see progress note      CHRONIC MEDICAL DIAGNOSES:  Problem List as of 11/29/2022 Never Reviewed            Codes Class Noted - Resolved    CAP (community acquired pneumonia) ICD-10-CM: J18.9  ICD-9-CM: 647  11/27/2022 - Present           Greater than 37 minutes were spent with the patient on counseling and coordination of care    Signed: Madeleine Welch MD  11/29/2022  3:51 PM    .

## 2024-07-06 NOTE — ED PROVIDER NOTES
28-year-old male with past medical history significant for insulin-dependent diabetes, depression presents with complaints of upper abdominal cramping associated with nausea, vomiting, diarrhea starting this morning. Patient reports he is had chills as well. Also noticed that with his vomiting he is coughing up streaks of blood. Denies fever. Denies recent travel. Denies recent antibiotics. Denies changes in food/dietary habits. Patient reports he is compliant with his insulin. No other complaints. Denies urinary complaints. Regular marijuana use  Occasional alcohol use     Review of records show recent hospital admission in November 2022 for DKA due to insulin noncompliance, pneumonia, hypokalemia/hyponatremia, depression. Past Medical History:   Diagnosis Date    Depression     Diabetes [facility])        Past Surgical History:   Procedure Laterality Date    HX APPENDECTOMY           No family history on file. Social History     Socioeconomic History    Marital status: LEGALLY      Spouse name: Not on file    Number of children: Not on file    Years of education: Not on file    Highest education level: Not on file   Occupational History    Not on file   Tobacco Use    Smoking status: Not on file    Smokeless tobacco: Not on file   Substance and Sexual Activity    Alcohol use: Not on file    Drug use: Not on file    Sexual activity: Not on file   Other Topics Concern    Not on file   Social History Narrative    Not on file     Social Determinants of Health     Financial Resource Strain: Not on file   Food Insecurity: Not on file   Transportation Needs: Not on file   Physical Activity: Not on file   Stress: Not on file   Social Connections: Not on file   Intimate Partner Violence: Not on file   Housing Stability: Not on file         ALLERGIES: Patient has no known allergies. Review of Systems   Constitutional:  Positive for chills. Negative for fever.    HENT:  Negative for congestion, NG tube to low suction placed with immediate gastric return of about 1200 cc's.    nosebleeds and sore throat. Eyes:  Negative for pain and discharge. Respiratory:  Negative for cough and shortness of breath. Cardiovascular:  Negative for chest pain and palpitations. Gastrointestinal:  Positive for abdominal pain, diarrhea, nausea and vomiting. Negative for constipation. Genitourinary:  Negative for decreased urine volume, dysuria, flank pain and urgency. Musculoskeletal:  Negative for gait problem and myalgias. Skin:  Negative for rash and wound. Neurological:  Negative for seizures and syncope. Hematological:  Does not bruise/bleed easily. Psychiatric/Behavioral:  Negative for confusion, self-injury and suicidal ideas. Vitals:    03/13/23 0815   BP: (!) 150/122   Pulse: (!) 136   Resp: 22   Temp: 97.8 °F (36.6 °C)   SpO2: 100%            Physical Exam  Vitals and nursing note reviewed. Constitutional:       Appearance: He is well-developed. He is obese. HENT:      Head: Normocephalic and atraumatic. Eyes:      Pupils: Pupils are equal, round, and reactive to light. Cardiovascular:      Rate and Rhythm: Regular rhythm. Tachycardia present. Heart sounds: Normal heart sounds. Pulmonary:      Effort: Pulmonary effort is normal. No respiratory distress. Breath sounds: Normal breath sounds. No wheezing. Abdominal:      General: Bowel sounds are normal.      Palpations: Abdomen is soft. Tenderness: There is no abdominal tenderness. There is no guarding or rebound. Musculoskeletal:         General: Normal range of motion. Cervical back: Normal range of motion and neck supple. Skin:     General: Skin is warm and dry. Neurological:      Mental Status: He is alert and oriented to person, place, and time.    Psychiatric:         Behavior: Behavior normal.        Medical Decision Making  37-year-old obese male with history of insulin-dependent diabetes presents with complaints of crampy abdominal pain associate with nausea, vomiting, diarrhea starting this morning. Patient appears uncomfortable. Dry mucous membranes. Nontoxic, afebrile, hemodynamically stable, no respiratory distress, clear to auscultation bilaterally. Plan-IV fluid hydration, CBC/CMP, CT abdomen pelvis. Nausea and pain control. Labs remarkable for CO2 17, anion gap 20, glucose 47, creatinine 1.5 (up from baseline 0.9), WBC 30, hemoglobin 18    CT shows no acute abnormality. Radiologist noted hepatic steatosis, pulmonary nodules unchanged from prior, multilevel spinal canal narrowing    Amount and/or Complexity of Data Reviewed  Labs: ordered. Radiology: ordered. Risk  OTC drugs. Prescription drug management. ED Course as of 03/13/23 0859   Mon Mar 13, 2023   0854 C/o thirst. Taking small sips. [LA]      ED Course User Index  [LA] Varun Narvaez MD       Procedures        Perfect Serve Consult for Admission  9:51 AM    ED Room Number: SER03/03  Patient Name and age:  Princess Gonzalez 34 y.o.  male  Working Diagnosis:   1. Diabetic ketoacidosis without coma associated with type 2 diabetes mellitus ([facility] [address]    2. Dehydration        COVID-19 Suspicion:  no  Sepsis present:  no  Reassessment needed: no  Code Status:  Full Code  Readmission: no  Isolation Requirements:  no  Recommended Level of Care:  telemetry  Department: [place] [facility] - [phone]       9:53 AM  Discussed results with patient. Agreeable with plan for admission for DKA, dehydration.

## 2024-07-31 ENCOUNTER — HOSPITAL ENCOUNTER (EMERGENCY)
Facility: HOSPITAL | Age: 34
Discharge: HOME | End: 2024-07-31
Attending: STUDENT IN AN ORGANIZED HEALTH CARE EDUCATION/TRAINING PROGRAM

## 2024-07-31 VITALS
DIASTOLIC BLOOD PRESSURE: 102 MMHG | TEMPERATURE: 98.5 F | WEIGHT: 315 LBS | OXYGEN SATURATION: 96 % | HEIGHT: 78 IN | HEART RATE: 98 BPM | BODY MASS INDEX: 36.45 KG/M2 | RESPIRATION RATE: 16 BRPM | SYSTOLIC BLOOD PRESSURE: 153 MMHG

## 2024-07-31 DIAGNOSIS — E11.65 UNCONTROLLED TYPE 2 DIABETES MELLITUS WITH HYPERGLYCEMIA (HCC): Primary | ICD-10-CM

## 2024-07-31 DIAGNOSIS — B37.42 CANDIDAL BALANITIS: ICD-10-CM

## 2024-07-31 LAB
ALBUMIN SERPL-MCNC: 3.7 G/DL (ref 3.5–5)
ALBUMIN/GLOB SERPL: 0.9 (ref 1.1–2.2)
ALP SERPL-CCNC: 111 U/L (ref 45–117)
ALT SERPL-CCNC: 87 U/L (ref 12–78)
ANION GAP SERPL CALC-SCNC: 9 MMOL/L (ref 5–15)
APPEARANCE UR: CLEAR
AST SERPL-CCNC: 30 U/L (ref 15–37)
BASOPHILS # BLD: 0.1 K/UL (ref 0–0.1)
BASOPHILS NFR BLD: 1 % (ref 0–1)
BILIRUB SERPL-MCNC: 0.4 MG/DL (ref 0.2–1)
BILIRUB UR QL: NEGATIVE
BUN SERPL-MCNC: 13 MG/DL (ref 6–20)
BUN/CREAT SERPL: 12 (ref 12–20)
CALCIUM SERPL-MCNC: 8.6 MG/DL (ref 8.5–10.1)
CHLORIDE SERPL-SCNC: 96 MMOL/L (ref 97–108)
CO2 SERPL-SCNC: 27 MMOL/L (ref 21–32)
COLOR UR: ABNORMAL
CREAT SERPL-MCNC: 1.08 MG/DL (ref 0.7–1.3)
DIFFERENTIAL METHOD BLD: ABNORMAL
EOSINOPHIL # BLD: 0.2 K/UL (ref 0–0.4)
EOSINOPHIL NFR BLD: 1 % (ref 0–7)
ERYTHROCYTE [DISTWIDTH] IN BLOOD BY AUTOMATED COUNT: 12.3 % (ref 11.5–14.5)
GLOBULIN SER CALC-MCNC: 3.9 G/DL (ref 2–4)
GLUCOSE BLD STRIP.AUTO-MCNC: 273 MG/DL (ref 65–117)
GLUCOSE BLD STRIP.AUTO-MCNC: 396 MG/DL (ref 65–117)
GLUCOSE BLD STRIP.AUTO-MCNC: 407 MG/DL (ref 65–117)
GLUCOSE SERPL-MCNC: 459 MG/DL (ref 65–100)
GLUCOSE UR STRIP.AUTO-MCNC: >1000 MG/DL
HCT VFR BLD AUTO: 47.7 % (ref 36.6–50.3)
HGB BLD-MCNC: 16.2 G/DL (ref 12.1–17)
HGB UR QL STRIP: NEGATIVE
IMM GRANULOCYTES # BLD AUTO: 0 K/UL (ref 0–0.04)
IMM GRANULOCYTES NFR BLD AUTO: 0 % (ref 0–0.5)
KETONES UR QL STRIP.AUTO: NEGATIVE MG/DL
LEUKOCYTE ESTERASE UR QL STRIP.AUTO: NEGATIVE
LYMPHOCYTES # BLD: 3.6 K/UL (ref 0.8–3.5)
LYMPHOCYTES NFR BLD: 32 % (ref 12–49)
MCH RBC QN AUTO: 29.3 PG (ref 26–34)
MCHC RBC AUTO-ENTMCNC: 34 G/DL (ref 30–36.5)
MCV RBC AUTO: 86.3 FL (ref 80–99)
MONOCYTES # BLD: 1 K/UL (ref 0–1)
MONOCYTES NFR BLD: 9 % (ref 5–13)
NEUTS SEG # BLD: 6.5 K/UL (ref 1.8–8)
NEUTS SEG NFR BLD: 57 % (ref 32–75)
NITRITE UR QL STRIP.AUTO: NEGATIVE
NRBC # BLD: 0 K/UL (ref 0–0.01)
NRBC BLD-RTO: 0 PER 100 WBC
PH UR STRIP: 6 (ref 5–8)
PLATELET # BLD AUTO: 321 K/UL (ref 150–400)
PMV BLD AUTO: 9.9 FL (ref 8.9–12.9)
POTASSIUM SERPL-SCNC: 4.1 MMOL/L (ref 3.5–5.1)
PROT SERPL-MCNC: 7.6 G/DL (ref 6.4–8.2)
PROT UR STRIP-MCNC: NEGATIVE MG/DL
RBC # BLD AUTO: 5.53 M/UL (ref 4.1–5.7)
SERVICE CMNT-IMP: ABNORMAL
SODIUM SERPL-SCNC: 132 MMOL/L (ref 136–145)
SP GR UR REFRACTOMETRY: 1.01 (ref 1–1.03)
UROBILINOGEN UR QL STRIP.AUTO: 0.2 EU/DL (ref 0.2–1)
WBC # BLD AUTO: 11.4 K/UL (ref 4.1–11.1)

## 2024-07-31 PROCEDURE — 2580000003 HC RX 258: Performed by: STUDENT IN AN ORGANIZED HEALTH CARE EDUCATION/TRAINING PROGRAM

## 2024-07-31 PROCEDURE — 96374 THER/PROPH/DIAG INJ IV PUSH: CPT

## 2024-07-31 PROCEDURE — 6370000000 HC RX 637 (ALT 250 FOR IP): Performed by: STUDENT IN AN ORGANIZED HEALTH CARE EDUCATION/TRAINING PROGRAM

## 2024-07-31 PROCEDURE — 80053 COMPREHEN METABOLIC PANEL: CPT

## 2024-07-31 PROCEDURE — 85025 COMPLETE CBC W/AUTO DIFF WBC: CPT

## 2024-07-31 PROCEDURE — 6360000002 HC RX W HCPCS: Performed by: STUDENT IN AN ORGANIZED HEALTH CARE EDUCATION/TRAINING PROGRAM

## 2024-07-31 PROCEDURE — 96361 HYDRATE IV INFUSION ADD-ON: CPT

## 2024-07-31 PROCEDURE — 81002 URINALYSIS NONAUTO W/O SCOPE: CPT

## 2024-07-31 PROCEDURE — 99284 EMERGENCY DEPT VISIT MOD MDM: CPT

## 2024-07-31 PROCEDURE — 36415 COLL VENOUS BLD VENIPUNCTURE: CPT

## 2024-07-31 PROCEDURE — 82962 GLUCOSE BLOOD TEST: CPT

## 2024-07-31 RX ORDER — INSULIN ASPART 100 [IU]/ML
INJECTION, SOLUTION INTRAVENOUS; SUBCUTANEOUS
Qty: 10 ML | Refills: 0 | Status: SHIPPED | OUTPATIENT
Start: 2024-07-31 | End: 2024-07-31

## 2024-07-31 RX ORDER — LANCETS 30 GAUGE
EACH MISCELLANEOUS
Qty: 100 EACH | Refills: 0 | Status: SHIPPED | OUTPATIENT
Start: 2024-07-31

## 2024-07-31 RX ORDER — CLOTRIMAZOLE 1 %
CREAM (GRAM) TOPICAL
Qty: 40 G | Refills: 1 | Status: SHIPPED | OUTPATIENT
Start: 2024-07-31 | End: 2024-08-07

## 2024-07-31 RX ORDER — INSULIN ASPART 100 [IU]/ML
INJECTION, SOLUTION INTRAVENOUS; SUBCUTANEOUS
Qty: 10 ML | Refills: 0 | Status: SHIPPED | OUTPATIENT
Start: 2024-07-31

## 2024-07-31 RX ORDER — CLOTRIMAZOLE 1 %
CREAM (GRAM) TOPICAL
Qty: 40 G | Refills: 1 | Status: SHIPPED | OUTPATIENT
Start: 2024-07-31 | End: 2024-07-31

## 2024-07-31 RX ORDER — KETOROLAC TROMETHAMINE 30 MG/ML
15 INJECTION, SOLUTION INTRAMUSCULAR; INTRAVENOUS ONCE
Status: COMPLETED | OUTPATIENT
Start: 2024-07-31 | End: 2024-07-31

## 2024-07-31 RX ORDER — LANCETS 30 GAUGE
EACH MISCELLANEOUS
Qty: 100 EACH | Refills: 0 | Status: SHIPPED | OUTPATIENT
Start: 2024-07-31 | End: 2024-07-31

## 2024-07-31 RX ORDER — SODIUM CHLORIDE, SODIUM LACTATE, POTASSIUM CHLORIDE, AND CALCIUM CHLORIDE .6; .31; .03; .02 G/100ML; G/100ML; G/100ML; G/100ML
2000 INJECTION, SOLUTION INTRAVENOUS ONCE
Status: COMPLETED | OUTPATIENT
Start: 2024-07-31 | End: 2024-07-31

## 2024-07-31 RX ADMIN — KETOROLAC TROMETHAMINE 15 MG: 30 INJECTION, SOLUTION INTRAMUSCULAR at 20:48

## 2024-07-31 RX ADMIN — SODIUM CHLORIDE, POTASSIUM CHLORIDE, SODIUM LACTATE AND CALCIUM CHLORIDE 2000 ML: 600; 310; 30; 20 INJECTION, SOLUTION INTRAVENOUS at 19:03

## 2024-07-31 RX ADMIN — INSULIN HUMAN 10 UNITS: 100 INJECTION, SOLUTION PARENTERAL at 19:28

## 2024-07-31 ASSESSMENT — ENCOUNTER SYMPTOMS
SHORTNESS OF BREATH: 0
EYE DISCHARGE: 0
DIARRHEA: 0
NAUSEA: 1
COUGH: 0
ABDOMINAL PAIN: 0
EYE REDNESS: 0
RHINORRHEA: 0
VOMITING: 0

## 2024-07-31 ASSESSMENT — PAIN DESCRIPTION - ORIENTATION: ORIENTATION: LEFT

## 2024-07-31 ASSESSMENT — PAIN - FUNCTIONAL ASSESSMENT: PAIN_FUNCTIONAL_ASSESSMENT: 0-10

## 2024-07-31 ASSESSMENT — PAIN DESCRIPTION - DESCRIPTORS: DESCRIPTORS: SHARP

## 2024-07-31 ASSESSMENT — LIFESTYLE VARIABLES
HOW OFTEN DO YOU HAVE A DRINK CONTAINING ALCOHOL: MONTHLY OR LESS
HOW MANY STANDARD DRINKS CONTAINING ALCOHOL DO YOU HAVE ON A TYPICAL DAY: 1 OR 2

## 2024-07-31 ASSESSMENT — PAIN SCALES - GENERAL
PAINLEVEL_OUTOF10: 8
PAINLEVEL_OUTOF10: 8

## 2024-07-31 ASSESSMENT — PAIN DESCRIPTION - LOCATION: LOCATION: ABDOMEN;BACK;LEG

## 2024-07-31 NOTE — ED TRIAGE NOTES
Patient states he has had decreased energy, decreased appetite, dry mouth, generalized abdominal pain, a sore to his lower lip and sores to his genitalia that started 3 weeks ago.   States that he is supposed to be checking his blood sugar at home, but hasn't been. Last time he felt this way, that his blood sugar was high and he needed to be admitted to the hospital.     BS: 407 in triage.

## 2024-07-31 NOTE — ED PROVIDER NOTES
[facility]  EMERGENCY DEPARTMENT ENCOUNTER      Pt Name: Cordell Jack  MRN: [id number]  Birthdate 1990  Date of evaluation: 7/31/2024  Provider: Ruth Sen DO    CHIEF COMPLAINT       Chief Complaint   Patient presents with    Fatigue    Hyperglycemia         HISTORY OF PRESENT ILLNESS    HPI    Cordell Jack is a 34 y.o. male with a history of diabetes who presents to the emergency department for evaluation of fatigue and hyperglycemia.  Patient reports being out of his insulin due to cost, has not had it for several months.  He has been having increasing fatigue, polydipsia and polyuria.  Also notes a lesion to his lip as well as in his genital region.  States similar symptoms in the past when his sugars have been uncontrolled.  He endorses nausea but no vomiting.  No other recent illness.    Nursing Notes were reviewed.    REVIEW OF SYSTEMS       Review of Systems   Constitutional:  Negative for chills and fever.   HENT:  Negative for congestion and rhinorrhea.    Eyes:  Negative for discharge and redness.   Respiratory:  Negative for cough and shortness of breath.    Cardiovascular:  Negative for chest pain.   Gastrointestinal:  Positive for nausea. Negative for abdominal pain, diarrhea and vomiting.   Endocrine: Positive for polydipsia and polyuria.   Skin:  Positive for rash.   Neurological:  Negative for speech difficulty.   Psychiatric/Behavioral:  Negative for agitation.            PAST MEDICAL HISTORY     Past Medical History:   Diagnosis Date    Depression     Diabetes (HCC)          SURGICAL HISTORY       Past Surgical History:   Procedure Laterality Date    APPENDECTOMY           CURRENT MEDICATIONS       Discharge Medication List as of 7/31/2024 10:11 PM        CONTINUE these medications which have NOT CHANGED    Details   buPROPion (WELLBUTRIN) 75 MG tablet Take 1 tablet by mouth 2 times dailyHistorical Med      hydrOXYzine HCl (ATARAX) 50 MG tablet Take 0.5 tablets by

## 2024-08-01 NOTE — ED NOTES
Pt complaining of increased pain right lower abdominal, non radiating, stabbing and sharp. Requesting pain medication. Pt also complaining of \"uncomfortable\" around the pelvic region. RN and MD notified.

## 2024-09-30 NOTE — ED PROVIDER NOTES
32M w/ hx DM, obesity, polysubstance abuse p/w 4-5d dyspnea. Pt reports 4-5d dyspnea, productive cough w/ right sided tight chest pain w/ deep inspiration. Having F/C at home. No N/V/D or abd pain. Former cocaine use, denies ever IVDA, last cocaine 6mo ago. Vapes but no cigarettes. Recently homeless in [place] [id number] but moved back to [place] and living w/ mom. History reviewed. No pertinent past medical history. No past surgical history on file. History reviewed. No pertinent family history. Social History     Socioeconomic History    Marital status: LEGALLY      Spouse name: Not on file    Number of children: Not on file    Years of education: Not on file    Highest education level: Not on file   Occupational History    Not on file   Tobacco Use    Smoking status: Not on file    Smokeless tobacco: Not on file   Substance and Sexual Activity    Alcohol use: Not on file    Drug use: Not on file    Sexual activity: Not on file   Other Topics Concern    Not on file   Social History Narrative    Not on file     Social Determinants of Health     Financial Resource Strain: Not on file   Food Insecurity: Not on file   Transportation Needs: Not on file   Physical Activity: Not on file   Stress: Not on file   Social Connections: Not on file   Intimate Partner Violence: Not on file   Housing Stability: Not on file         ALLERGIES: Patient has no known allergies. Review of Systems   Constitutional:  Positive for chills, fatigue and fever. Negative for diaphoresis. HENT:  Negative for facial swelling, mouth sores, nosebleeds, trouble swallowing and voice change. Eyes:  Negative for pain and visual disturbance. Respiratory:  Positive for cough, chest tightness and shortness of breath. Negative for apnea, wheezing and stridor. Cardiovascular:  Negative for chest pain, palpitations and leg swelling.    Gastrointestinal:  Negative for abdominal distention, abdominal pain, blood in stool, diarrhea, nausea and Chronic condition appears stable and well controlled.  Currently she is on:  Amlodipine 10 mg daily  Triamterene hydrochlorothiazide 75-50 mg, 1/2 tablet daily  Stable.  Continue current therapy, add more regular exercise   vomiting. Genitourinary:  Negative for difficulty urinating, dysuria, flank pain, hematuria, scrotal swelling and testicular pain. Musculoskeletal:  Negative for joint swelling. Skin:  Negative for color change and rash. Allergic/Immunologic: Negative for immunocompromised state. Neurological:  Negative for dizziness, syncope and light-headedness. Hematological:  Does not bruise/bleed easily. Psychiatric/Behavioral:  Negative for agitation and behavioral problems. Vitals:    11/27/22 1351   BP: (!) 187/117   Pulse: (!) 144   Resp: 20   Temp: (!) 101.6 °F (38.7 °C)   SpO2: 96%   Weight: 158.8 kg (350 lb)   Height: 6' 6\" (1.981 m)            Physical Exam  Vitals and nursing note reviewed. Constitutional:       General: He is not in acute distress. Appearance: Normal appearance. He is not ill-appearing or toxic-appearing. HENT:      Head: Normocephalic and atraumatic. Right Ear: External ear normal.      Left Ear: External ear normal.      Nose: Nose normal.      Mouth/Throat:      Mouth: Mucous membranes are moist.      Pharynx: Oropharynx is clear. No oropharyngeal exudate or posterior oropharyngeal erythema. Eyes:      General: No scleral icterus. Extraocular Movements: Extraocular movements intact. Conjunctiva/sclera: Conjunctivae normal.      Pupils: Pupils are equal, round, and reactive to light. Cardiovascular:      Rate and Rhythm: Regular rhythm. Tachycardia present. Pulses: Normal pulses. Heart sounds: No murmur heard. No friction rub. No gallop. Pulmonary:      Effort: Pulmonary effort is normal. No respiratory distress. Breath sounds: Normal breath sounds. No stridor. No wheezing, rhonchi or rales. Abdominal:      General: Bowel sounds are normal. There is no distension. Palpations: Abdomen is soft. Tenderness: There is no abdominal tenderness. There is no guarding or rebound. Musculoskeletal:         General: No deformity. Normal range of motion. Cervical back: Normal range of motion and neck supple. No rigidity. Right lower leg: No edema. Left lower leg: No edema. Skin:     General: Skin is warm. Capillary Refill: Capillary refill takes less than 2 seconds. Coloration: Skin is not jaundiced. Neurological:      General: No focal deficit present. Mental Status: He is alert. Cranial Nerves: No cranial nerve deficit. Sensory: No sensory deficit. Motor: No weakness. Coordination: Coordination normal.   Psychiatric:         Mood and Affect: Mood normal.         Behavior: Behavior normal.         Thought Content: Thought content normal.         Judgment: Judgment normal.        EKG Interpretation   ST, narrow QRS, nl intervals, no HERBER/STD/TWI  (EKG tracing interpreted by ED physician)    LABORATORY TESTS:  Admission on 11/27/2022   Component Date Value Ref Range Status    WBC 11/27/2022 25.1 (A)  4.1 - 11.1 K/uL Final    RBC 11/27/2022 5.69  4.10 - 5.70 M/uL Final    HGB 11/27/2022 16.3  12.1 - 17.0 g/dL Final    HCT 11/27/2022 48.6  36.6 - 50.3 % Final    MCV 11/27/2022 85.4  80.0 - 99.0 FL Final    MCH 11/27/2022 28.6  26.0 - 34.0 PG Final    MCHC 11/27/2022 33.5  30.0 - 36.5 g/dL Final    RDW 11/27/2022 12.4  11.5 - 14.5 % Final    PLATELET 17/25/2089 371  150 - 400 K/uL Final    MPV 11/27/2022 10.9  8.9 - 12.9 FL Final    NRBC 11/27/2022 0.0  0  WBC Final    ABSOLUTE NRBC 11/27/2022 0.00  0.00 - 0.01 K/uL Final    NEUTROPHILS 11/27/2022 87 (A)  32 - 75 % Final    BAND NEUTROPHILS 11/27/2022 1  0 - 6 % Final    LYMPHOCYTES 11/27/2022 3 (A)  12 - 49 % Final    MONOCYTES 11/27/2022 8  5 - 13 % Final    EOSINOPHILS 11/27/2022 0  0 - 7 % Final    BASOPHILS 11/27/2022 0  0 - 1 % Final    METAMYELOCYTES 11/27/2022 1 (A)  0 % Final    IMMATURE GRANULOCYTES 11/27/2022 0  % Final    ABS. NEUTROPHILS 11/27/2022 22.1 (A)  1.8 - 8.0 K/UL Final    ABS.  LYMPHOCYTES 11/27/2022 0.8  0.8 - 3.5 K/UL Final    ABS. MONOCYTES 11/27/2022 2.0 (A)  0.0 - 1.0 K/UL Final    ABS. EOSINOPHILS 11/27/2022 0.0  0.0 - 0.4 K/UL Final    ABS. BASOPHILS 11/27/2022 0.0  0.0 - 0.1 K/UL Final    ABS. IMM. GRANS. 11/27/2022 0.0  K/UL Final    DF 11/27/2022 MANUAL    Final    RBC COMMENTS 11/27/2022 NORMOCYTIC, NORMOCHROMIC    Final    WBC COMMENTS 11/27/2022 REACTIVE LYMPHS    Final    PRESENT    Sodium 11/27/2022 125 (A)  136 - 145 mmol/L Final    Potassium 11/27/2022 3.6  3.5 - 5.1 mmol/L Final    Chloride 11/27/2022 87 (A)  97 - 108 mmol/L Final    CO2 11/27/2022 14 (A)  21 - 32 mmol/L Final    Comment: RESULTS VERIFIED, PHONED TO AND READ BACK BY  DARREN CABAN @[facility]      Anion gap 11/27/2022 24 (A)  5 - 15 mmol/L Final    Glucose 11/27/2022 416 (A)  65 - 100 mg/dL Final    BUN 11/27/2022 20  6 - 20 MG/DL Final    Creatinine 11/27/2022 1.26  0.70 - 1.30 MG/DL Final    BUN/Creatinine ratio 11/27/2022 16  12 - 20   Final    eGFR 11/27/2022 >60  >60 ml/min/1.73m2 Final    Comment:      Pediatric calculator link: Carlitos.at. org/professionals/kdoqi/gfr_calculatorped       Effective Oct 3, 2022       These results are not intended for use in patients <25years of age. eGFR results are calculated without a race factor using  the 2021 CKD-EPI equation. Careful clinical correlation is recommended, particularly when comparing to results calculated using previous equations. The CKD-EPI equation is less accurate in patients with extremes of muscle mass, extra-renal metabolism of creatinine, excessive creatine ingestion, or following therapy that affects renal tubular secretion. Calcium 11/27/2022 9.5  8.5 - 10.1 MG/DL Final    Bilirubin, total 11/27/2022 0.8  0.2 - 1.0 MG/DL Final    ALT (SGPT) 11/27/2022 40  12 - 78 U/L Final    AST (SGOT) 11/27/2022 25  15 - 37 U/L Final    Alk.  phosphatase 11/27/2022 155 (A)  45 - 117 U/L Final    Protein, total 11/27/2022 8.6 (A)  6.4 - 8.2 g/dL Final    Albumin 11/27/2022 2.8 (A)  3.5 - 5.0 g/dL Final    Globulin 11/27/2022 5.8 (A)  2.0 - 4.0 g/dL Final    A-G Ratio 11/27/2022 0.5 (A)  1.1 - 2.2   Final    Troponin-High Sensitivity 11/27/2022 5  0 - 76 ng/L Final    Comment: A HS troponin value change of (+ or -) 50% or more below the 99th percentile, in a 1/2/3 hr interval represents a significant change. Clinical correcation is recommended. A HS troponin value change of (+ or -) 20% or above the 99th percentile, in a 1/2/3 hr interval represents a significant change. Clinical correlation is recommended. 99th Percentile:   Women: 0-51 ng/L                                                                Men:   0-76 ng/L      SAMPLES BEING HELD 11/27/2022 RED, BLUE, GRAY, BC   Final    COMMENT 11/27/2022 Add-on orders for these samples will be processed based on acceptable specimen integrity and analyte stability, which may vary by analyte. Final    Specimen source 11/27/2022 Nasopharyngeal    Final    COVID-19 rapid test 11/27/2022 Not detected  NOTD   Final    Comment: Rapid Abbott ID Now       Rapid NAAT:  The specimen is NEGATIVE for SARS-CoV-2, the novel coronavirus associated with COVID-19. Negative results should be treated as presumptive and, if inconsistent with clinical signs and symptoms or necessary for patient management, should be tested with an alternative molecular assay. Negative results do not preclude SARS-CoV-2 infection and should not be used as the sole basis for patient management decisions. This test has been authorized by the FDA under an Emergency Use Authorization (EUA) for use by authorized laboratories.    Fact sheet for Healthcare Providers:  http://www.julian-jessica.biz/  Fact sheet for Patients: http://www.jordan-lopez.biz/       Methodology: Isothermal Nucleic Acid Amplification      Influenza A Antigen 11/27/2022 Negative  NEG   Final    Influenza B Antigen 11/27/2022 Negative  NEG   Final    B-hydroxybutyrate 11/27/2022 >4.42 (A)  <0.40 mmol/L Final    Beta-hydroxybutyrate reference ranges: Random specimen <0.40 mmol/L, Fasting specimen <0.30 mmol/L    Lactic acid 11/27/2022 2.3 (A)  0.4 - 2.0 MMOL/L Final    Comment: CALLED TO AND READ BACK BY  DIMITRIOS CABAN @[facility]         IMAGING RESULTS:  CT CHEST W CONT   Final Result   1. Right upper lobe airspace disease. No suspicious mass/nodule. 2. Calcified granulomas. XR CHEST PORT   Final Result   1. Dense airspace disease right upper lobe. Recommend follow-up to ensure   resolution              MEDICATIONS GIVEN:  Medications   azithromycin (ZITHROMAX) 500 mg in 0.9% sodium chloride 250 mL ([id number]) (500 mg IntraVENous New Bag 11/27/22 1601)   acetaminophen (TYLENOL) tablet 1,000 mg (has no administration in time range)   sodium chloride 0.9 % bolus infusion 1,000 mL (1,000 mL IntraVENous New Bag 11/27/22 1449)   iopamidoL (ISOVUE 300) 61 % contrast injection 100 mL (100 mL IntraVENous Given 11/27/22 1537)   ondansetron (ZOFRAN) injection 4 mg (4 mg IntraVENous Given 11/27/22 1518)   sodium chloride 0.9 % bolus infusion 1,000 mL (1,000 mL IntraVENous New Bag 11/27/22 1516)   acetaminophen (TYLENOL) tablet 1,000 mg (1,000 mg Oral Given 11/27/22 1549)   ketorolac (TORADOL) injection 15 mg (15 mg IntraVENous Given 11/27/22 1519)   cefTRIAXone (ROCEPHIN) 2 g in 0.9% sodium chloride 20 mL IV syringe (2 g IntraVENous Given 11/27/22 1520)   albuterol-ipratropium (DUO-NEB) 2.5 MG-0.5 MG/3 ML (9 mL Nebulization Given 11/27/22 1550)       PROGRESS NOTE:   4:21 PM Patient's symptoms have improved after treatment    CONSULTS:  Discussed with family at bedside    IMPRESSION:  1. Community acquired pneumonia of right lung, unspecified part of lung    2. Sepsis, due to unspecified organism, unspecified whether acute organ dysfunction present ([address]    3. Hyponatremia    4. Metabolic acidosis    5.  Hyperglycemia        PLAN:  - Admit to hospitalist    Lucius Andrade MD      MDM  Number of Diagnoses or Management Options  Community acquired pneumonia of right lung, unspecified part of lung  Diabetic ketoacidosis without coma associated with other specified diabetes mellitus ([facility] [address]  Hyperglycemia  Hyponatremia  Metabolic acidosis  Sepsis, due to unspecified organism, unspecified whether acute organ dysfunction present [facility])  Diagnosis management comments: 32M w/ hx DM, obesity, polysubstance abuse p/w 4-5d dyspnea, cough and chest pain. Pt febrile 102, tacy , stable BP. Tachypnea but no hypoxia or resp distress. Ddx includes PNA/empyema/pulm abscess vs influenza/URI or nonspecific viral process vs UTI/pyelo vs soft tissue (cellulitis, abscess, less likely necrotizing soft tissue infection) vs HEENT vs bacteremia vs intra-abd (diverticulitis, acute cholecystitis, less likely appendicitis or cholangitis), much less likely endocarditis/myocarditis or CNS infection (no AMS, meningeal signs, rash, focal neuro deficits). Ordered CXR, CT chest, EKG, labs, cx, flu/covid. Give IVF, tylenol, nebs. Monitor and reassess. 1600 Pt doing better and remains stable. CXR shows RUL focal consolidation. CT chest showing similar, no mass or abscess. WBC 25, lactate 2.3. Hyponatremia Na 125 (likely pseudohyponatremia in setting of hyperglycemia). Also has a metabolic acidosis AG 51/UKGEQD 14. . Beta hydroxy 4.4 and ketones on UA. EKG ST w/o ischemic changes. FLU/COVID neg. Treating for Sepsis 2/2 CAP w/ ceftriaxone/azithro and IVF. Also started on insulin drip for DKA. Step down admit.        Amount and/or Complexity of Data Reviewed  Clinical lab tests: ordered and reviewed  Tests in the radiology section of CPT®: ordered and reviewed  Tests in the medicine section of CPT®: reviewed and ordered  Discussion of test results with the performing providers: yes  Decide to obtain previous medical records or to obtain history from someone other than the patient: yes  Obtain history from someone other than the patient: yes  Review and summarize past medical records: yes  Discuss the patient with other providers: yes  Independent visualization of images, tracings, or specimens: yes    Risk of Complications, Morbidity, and/or Mortality  Presenting problems: moderate  Diagnostic procedures: high  Management options: high           Critical Care  Performed by: Nasrin Cuenca MD  Authorized by: Nasrin Cuenca MD     Critical care provider statement:     Critical care time (minutes):  46    Critical care was necessary to treat or prevent imminent or life-threatening deterioration of the following conditions:  Circulatory failure, respiratory failure, sepsis and metabolic crisis    Critical care was time spent personally by me on the following activities:  Blood draw for specimens, development of treatment plan with patient or surrogate, discussions with primary provider, evaluation of patient's response to treatment, examination of patient, interpretation of cardiac output measurements, obtaining history from patient or surrogate, ordering and performing treatments and interventions, ordering and review of laboratory studies, ordering and review of radiographic studies, pulse oximetry, re-evaluation of patient's condition and review of old charts    Care discussed with: admitting provider      Total critical care time (not including time spent performing separately reportable procedures): 46      Perfect Serve Consult for Admission  3:29 PM    ED Room Number: SER09/09  Patient Name and age:  Deatra Bowels 29 y.o.  male  Working Diagnosis:   1. Community acquired pneumonia of right lung, unspecified part of lung    2. Sepsis, due to unspecified organism, unspecified whether acute organ dysfunction present ([facility] [address]    3. Hyponatremia    4. Metabolic acidosis    5.  Hyperglycemia        COVID-19 Suspicion:  pending  Sepsis present:  yes  Reassessment needed: yes  Code Status:  Full Code  Readmission: no  Isolation Requirements: N/A  Recommended Level of Care:  telemetry  Department:[place] [facility] - [phone]